# Patient Record
Sex: FEMALE | Race: WHITE | NOT HISPANIC OR LATINO | Employment: FULL TIME | URBAN - METROPOLITAN AREA
[De-identification: names, ages, dates, MRNs, and addresses within clinical notes are randomized per-mention and may not be internally consistent; named-entity substitution may affect disease eponyms.]

---

## 2023-04-21 LAB
ALANINE AMINOTRANSFERASE (SGPT) (U/L) IN SER/PLAS: 10 U/L (ref 7–45)
ALBUMIN (G/DL) IN SER/PLAS: 3.6 G/DL (ref 3.4–5)
ALKALINE PHOSPHATASE (U/L) IN SER/PLAS: 103 U/L (ref 33–136)
ANION GAP IN SER/PLAS: 10 MMOL/L (ref 10–20)
ASPARTATE AMINOTRANSFERASE (SGOT) (U/L) IN SER/PLAS: 12 U/L (ref 9–39)
BILIRUBIN TOTAL (MG/DL) IN SER/PLAS: 0.3 MG/DL (ref 0–1.2)
C REACTIVE PROTEIN (MG/L) IN SER/PLAS: 2.47 MG/DL
CALCIUM (MG/DL) IN SER/PLAS: 8.1 MG/DL (ref 8.6–10.6)
CARBON DIOXIDE, TOTAL (MMOL/L) IN SER/PLAS: 27 MMOL/L (ref 21–32)
CHLORIDE (MMOL/L) IN SER/PLAS: 105 MMOL/L (ref 98–107)
CREATININE (MG/DL) IN SER/PLAS: 0.98 MG/DL (ref 0.5–1.05)
ERYTHROCYTE DISTRIBUTION WIDTH (RATIO) BY AUTOMATED COUNT: 22.1 % (ref 11.5–14.5)
ERYTHROCYTE MEAN CORPUSCULAR HEMOGLOBIN CONCENTRATION (G/DL) BY AUTOMATED: 29.4 G/DL (ref 32–36)
ERYTHROCYTE MEAN CORPUSCULAR VOLUME (FL) BY AUTOMATED COUNT: 83 FL (ref 80–100)
ERYTHROCYTES (10*6/UL) IN BLOOD BY AUTOMATED COUNT: 3.74 X10E12/L (ref 4–5.2)
GFR FEMALE: 63 ML/MIN/1.73M2
GLUCOSE (MG/DL) IN SER/PLAS: 75 MG/DL (ref 74–99)
HEMATOCRIT (%) IN BLOOD BY AUTOMATED COUNT: 30.9 % (ref 36–46)
HEMOGLOBIN (G/DL) IN BLOOD: 9.1 G/DL (ref 12–16)
IMMATURE GRANULOCYTES/100 LEUKOCYTES IN BLOOD BY AUTOMATED COUNT: 0.5 % (ref 0–0.9)
LEUKOCYTES (10*3/UL) IN BLOOD BY AUTOMATED COUNT: 7.9 X10E9/L (ref 4.4–11.3)
MANUAL DIFFERENTIAL Y/N: ABNORMAL
NRBC (PER 100 WBCS) BY AUTOMATED COUNT: 0 /100 WBC (ref 0–0)
PLATELETS (10*3/UL) IN BLOOD AUTOMATED COUNT: 292 X10E9/L (ref 150–450)
POTASSIUM (MMOL/L) IN SER/PLAS: 3.8 MMOL/L (ref 3.5–5.3)
PROTEIN TOTAL: 6.7 G/DL (ref 6.4–8.2)
SODIUM (MMOL/L) IN SER/PLAS: 138 MMOL/L (ref 136–145)
UREA NITROGEN (MG/DL) IN SER/PLAS: 8 MG/DL (ref 6–23)

## 2023-04-22 LAB
BASOPHILS (10*3/UL) IN BLOOD BY MANUAL COUNT - WAM: 0.06 X10E9/L (ref 0–0.1)
BASOPHILS/100 LEUKOCYTES IN BLOOD BY MANUAL COUNT - WAM: 0.8 % (ref 0–2)
EOSINOPHILS (10*3/UL) IN BLOOD BY MANUAL COUNT - WAM: 0.21 X10E9/L (ref 0–0.7)
EOSINOPHILS/100 LEUKOCYTES IN BLOOD BY MANUAL COUNT - WAM: 2.6 % (ref 0–6)
LYMPHOCYTES (10*3/UL) IN BLOOD BY MANUAL COUNT - WAM: 1.35 X10E9/L (ref 1.2–4.8)
LYMPHOCYTES VARIANT/100 LEUKOCYTES IN BLOOD - WAM: 0.9 % (ref 0–2)
LYMPHOCYTES/100 LEUKOCYTES IN BLOOD BY MANUAL COUNT - WAM: 17.1 % (ref 13–44)
MONOCYTES (10*3/UL) IN BLOOD BY MANUAL COUNT - WAM: 0.67 X10E9/L (ref 0.1–1)
MONOCYTES/100 LEUKOCYTES IN BLOOD BY MANUAL COUNT - WAM: 8.5 % (ref 2–10)
NEUTROPHILS (SEGS+BANDS) (10*3/UL) MANUAL COUNT - WAM: 5.54 X10E9/L (ref 1.2–7.7)
OVALOCYTES PRESENCE IN BLOOD BY LIGHT MICROSCOPY: NORMAL
POLYCHROMASIA IN BLOOD BY LIGHT MICROSCOPY: NORMAL
RBC MORPHOLOGY IN BLOOD: NORMAL
SCHISTOCYTES (PRESENCE) IN BLOOD BY LIGHT MICROSCOPY: NORMAL
SEGMENTED NEUTROPHILS (10*3/UL) BLOOD MANUAL - WAM: 5.54 X10E9/L (ref 1.2–7)
SEGMENTED NEUTROPHILS/100 LEUKOCYTES BY MANUAL COUNT -: 70.1 % (ref 40–80)
VARIANT LYMPHOCYTES (10*3/UL) BLOOD MANUAL COUNT - WAM: 0.07 X10E9/L (ref 0–0.5)

## 2023-07-21 LAB
ALANINE AMINOTRANSFERASE (SGPT) (U/L) IN SER/PLAS: 9 U/L (ref 7–45)
ALBUMIN (G/DL) IN SER/PLAS: 3.8 G/DL (ref 3.4–5)
ALKALINE PHOSPHATASE (U/L) IN SER/PLAS: 86 U/L (ref 33–136)
ANION GAP IN SER/PLAS: 11 MMOL/L (ref 10–20)
ASPARTATE AMINOTRANSFERASE (SGOT) (U/L) IN SER/PLAS: 14 U/L (ref 9–39)
BASOPHILS (10*3/UL) IN BLOOD BY AUTOMATED COUNT: 0.07 X10E9/L (ref 0–0.1)
BASOPHILS/100 LEUKOCYTES IN BLOOD BY AUTOMATED COUNT: 1 % (ref 0–2)
BILIRUBIN TOTAL (MG/DL) IN SER/PLAS: 0.4 MG/DL (ref 0–1.2)
C REACTIVE PROTEIN (MG/L) IN SER/PLAS: 1.09 MG/DL
CALCIDIOL (25 OH VITAMIN D3) (NG/ML) IN SER/PLAS: 25 NG/ML
CALCIUM (MG/DL) IN SER/PLAS: 9.2 MG/DL (ref 8.6–10.3)
CARBON DIOXIDE, TOTAL (MMOL/L) IN SER/PLAS: 25 MMOL/L (ref 21–32)
CHLORIDE (MMOL/L) IN SER/PLAS: 102 MMOL/L (ref 98–107)
COBALAMIN (VITAMIN B12) (PG/ML) IN SER/PLAS: 1257 PG/ML (ref 211–911)
CREATININE (MG/DL) IN SER/PLAS: 1.02 MG/DL (ref 0.5–1.05)
EOSINOPHILS (10*3/UL) IN BLOOD BY AUTOMATED COUNT: 0.14 X10E9/L (ref 0–0.7)
EOSINOPHILS/100 LEUKOCYTES IN BLOOD BY AUTOMATED COUNT: 2.1 % (ref 0–6)
ERYTHROCYTE DISTRIBUTION WIDTH (RATIO) BY AUTOMATED COUNT: 22.4 % (ref 11.5–14.5)
ERYTHROCYTE MEAN CORPUSCULAR HEMOGLOBIN CONCENTRATION (G/DL) BY AUTOMATED: 29.9 G/DL (ref 32–36)
ERYTHROCYTE MEAN CORPUSCULAR VOLUME (FL) BY AUTOMATED COUNT: 81 FL (ref 80–100)
ERYTHROCYTES (10*6/UL) IN BLOOD BY AUTOMATED COUNT: 4.11 X10E12/L (ref 4–5.2)
FOLATE (NG/ML) IN SER/PLAS: 12.4 NG/ML
GFR FEMALE: 60 ML/MIN/1.73M2
GLUCOSE (MG/DL) IN SER/PLAS: 80 MG/DL (ref 74–99)
HEMATOCRIT (%) IN BLOOD BY AUTOMATED COUNT: 33.1 % (ref 36–46)
HEMOGLOBIN (G/DL) IN BLOOD: 9.9 G/DL (ref 12–16)
IMMATURE GRANULOCYTES/100 LEUKOCYTES IN BLOOD BY AUTOMATED COUNT: 0.3 % (ref 0–0.9)
IRON (UG/DL) IN SER/PLAS: 16 UG/DL (ref 35–150)
IRON BINDING CAPACITY (UG/DL) IN SER/PLAS: 420 UG/DL (ref 240–445)
IRON SATURATION (%) IN SER/PLAS: 4 % (ref 25–45)
LEUKOCYTES (10*3/UL) IN BLOOD BY AUTOMATED COUNT: 6.8 X10E9/L (ref 4.4–11.3)
LYMPHOCYTES (10*3/UL) IN BLOOD BY AUTOMATED COUNT: 1.87 X10E9/L (ref 1.2–4.8)
LYMPHOCYTES/100 LEUKOCYTES IN BLOOD BY AUTOMATED COUNT: 27.7 % (ref 13–44)
MONOCYTES (10*3/UL) IN BLOOD BY AUTOMATED COUNT: 0.5 X10E9/L (ref 0.1–1)
MONOCYTES/100 LEUKOCYTES IN BLOOD BY AUTOMATED COUNT: 7.4 % (ref 2–10)
NEUTROPHILS (10*3/UL) IN BLOOD BY AUTOMATED COUNT: 4.16 X10E9/L (ref 1.2–7.7)
NEUTROPHILS/100 LEUKOCYTES IN BLOOD BY AUTOMATED COUNT: 61.5 % (ref 40–80)
PLATELETS (10*3/UL) IN BLOOD AUTOMATED COUNT: 303 X10E9/L (ref 150–450)
POTASSIUM (MMOL/L) IN SER/PLAS: 3.9 MMOL/L (ref 3.5–5.3)
PROTEIN TOTAL: 7.3 G/DL (ref 6.4–8.2)
SODIUM (MMOL/L) IN SER/PLAS: 134 MMOL/L (ref 136–145)
UREA NITROGEN (MG/DL) IN SER/PLAS: 11 MG/DL (ref 6–23)

## 2023-07-22 LAB — FERRITIN (UG/LL) IN SER/PLAS: 12 UG/L (ref 8–150)

## 2023-10-26 PROBLEM — E53.8 VITAMIN B 12 DEFICIENCY: Status: ACTIVE | Noted: 2023-10-26

## 2023-10-26 PROBLEM — Z78.0 POSTMENOPAUSAL ESTROGEN DEFICIENCY: Status: ACTIVE | Noted: 2023-10-26

## 2023-10-26 PROBLEM — D64.9 ANEMIA: Status: ACTIVE | Noted: 2023-10-26

## 2023-10-26 PROBLEM — F17.210 CIGARETTE NICOTINE DEPENDENCE: Status: ACTIVE | Noted: 2023-10-26

## 2023-10-26 PROBLEM — R07.9 CHEST PAIN: Status: ACTIVE | Noted: 2023-10-26

## 2023-10-26 PROBLEM — E55.9 VITAMIN D DEFICIENCY: Status: ACTIVE | Noted: 2023-10-26

## 2023-10-26 PROBLEM — M06.9 RHEUMATOID ARTHRITIS (MULTI): Status: ACTIVE | Noted: 2023-10-26

## 2023-10-26 PROBLEM — R30.0 DYSURIA: Status: ACTIVE | Noted: 2023-10-26

## 2023-10-26 PROBLEM — K21.9 ESOPHAGEAL REFLUX: Status: ACTIVE | Noted: 2023-10-26

## 2023-10-26 PROBLEM — B00.2 ORAL HERPES: Status: ACTIVE | Noted: 2023-10-26

## 2023-10-26 PROBLEM — M85.80 OSTEOPENIA: Status: ACTIVE | Noted: 2023-10-26

## 2023-10-26 PROBLEM — E83.51 HYPOCALCEMIA: Status: ACTIVE | Noted: 2023-10-26

## 2023-10-26 PROBLEM — K22.70 BARRETT'S ESOPHAGUS: Status: ACTIVE | Noted: 2023-10-26

## 2023-10-26 PROBLEM — M25.579 ANKLE PAIN: Status: ACTIVE | Noted: 2023-10-26

## 2023-10-26 RX ORDER — OMEPRAZOLE 40 MG/1
1 CAPSULE, DELAYED RELEASE ORAL DAILY
COMMUNITY
Start: 2018-02-02 | End: 2024-02-07

## 2023-10-26 RX ORDER — FOLIC ACID 0.8 MG
1 TABLET ORAL DAILY
COMMUNITY
Start: 2013-12-06

## 2023-10-26 RX ORDER — LANOLIN ALCOHOL/MO/W.PET/CERES
100 CREAM (GRAM) TOPICAL DAILY
COMMUNITY

## 2023-10-26 RX ORDER — PREDNISONE 5 MG/1
5 TABLET ORAL DAILY PRN
COMMUNITY
Start: 2013-12-06

## 2023-10-26 RX ORDER — FERROUS SULFATE 325(65) MG
65 TABLET, DELAYED RELEASE (ENTERIC COATED) ORAL DAILY
COMMUNITY
End: 2023-10-27

## 2023-10-26 RX ORDER — METHOTREXATE 2.5 MG/1
8 TABLET ORAL
COMMUNITY
Start: 2013-10-14 | End: 2023-10-27

## 2023-10-26 RX ORDER — HYDROXYCHLOROQUINE SULFATE 200 MG/1
1 TABLET, FILM COATED ORAL DAILY
COMMUNITY
Start: 2013-10-14 | End: 2023-12-08

## 2023-10-27 ENCOUNTER — LAB (OUTPATIENT)
Dept: LAB | Facility: LAB | Age: 68
End: 2023-10-27
Payer: MEDICARE

## 2023-10-27 ENCOUNTER — OFFICE VISIT (OUTPATIENT)
Dept: RHEUMATOLOGY | Facility: CLINIC | Age: 68
End: 2023-10-27
Payer: MEDICARE

## 2023-10-27 VITALS
HEIGHT: 70 IN | BODY MASS INDEX: 23.22 KG/M2 | TEMPERATURE: 97.1 F | OXYGEN SATURATION: 98 % | WEIGHT: 162.2 LBS | HEART RATE: 63 BPM | SYSTOLIC BLOOD PRESSURE: 116 MMHG | DIASTOLIC BLOOD PRESSURE: 68 MMHG

## 2023-10-27 DIAGNOSIS — M06.9 RHEUMATOID ARTHRITIS INVOLVING MULTIPLE SITES, UNSPECIFIED WHETHER RHEUMATOID FACTOR PRESENT (MULTI): Primary | ICD-10-CM

## 2023-10-27 DIAGNOSIS — M06.9 RHEUMATOID ARTHRITIS INVOLVING MULTIPLE SITES, UNSPECIFIED WHETHER RHEUMATOID FACTOR PRESENT (MULTI): ICD-10-CM

## 2023-10-27 LAB
ALBUMIN SERPL BCP-MCNC: 3.8 G/DL (ref 3.4–5)
ALP SERPL-CCNC: 82 U/L (ref 33–136)
ALT SERPL W P-5'-P-CCNC: 10 U/L (ref 7–45)
ANION GAP SERPL CALC-SCNC: 14 MMOL/L (ref 10–20)
AST SERPL W P-5'-P-CCNC: 16 U/L (ref 9–39)
BASOPHILS # BLD AUTO: 0.04 X10*3/UL (ref 0–0.1)
BASOPHILS NFR BLD AUTO: 0.6 %
BILIRUB SERPL-MCNC: 0.3 MG/DL (ref 0–1.2)
BUN SERPL-MCNC: 9 MG/DL (ref 6–23)
CALCIUM SERPL-MCNC: 9 MG/DL (ref 8.6–10.3)
CHLORIDE SERPL-SCNC: 105 MMOL/L (ref 98–107)
CO2 SERPL-SCNC: 27 MMOL/L (ref 21–32)
CREAT SERPL-MCNC: 0.96 MG/DL (ref 0.5–1.05)
CRP SERPL-MCNC: 0.41 MG/DL
EOSINOPHIL # BLD AUTO: 0.18 X10*3/UL (ref 0–0.7)
EOSINOPHIL NFR BLD AUTO: 2.6 %
ERYTHROCYTE [DISTWIDTH] IN BLOOD BY AUTOMATED COUNT: 20.7 % (ref 11.5–14.5)
GFR SERPL CREATININE-BSD FRML MDRD: 65 ML/MIN/1.73M*2
GLUCOSE SERPL-MCNC: 77 MG/DL (ref 74–99)
HCT VFR BLD AUTO: 36.3 % (ref 36–46)
HGB BLD-MCNC: 11.3 G/DL (ref 12–16)
IMM GRANULOCYTES # BLD AUTO: 0.02 X10*3/UL (ref 0–0.7)
IMM GRANULOCYTES NFR BLD AUTO: 0.3 % (ref 0–0.9)
LYMPHOCYTES # BLD AUTO: 1.84 X10*3/UL (ref 1.2–4.8)
LYMPHOCYTES NFR BLD AUTO: 27 %
MCH RBC QN AUTO: 26.3 PG (ref 26–34)
MCHC RBC AUTO-ENTMCNC: 31.1 G/DL (ref 32–36)
MCV RBC AUTO: 85 FL (ref 80–100)
MONOCYTES # BLD AUTO: 0.66 X10*3/UL (ref 0.1–1)
MONOCYTES NFR BLD AUTO: 9.7 %
NEUTROPHILS # BLD AUTO: 4.07 X10*3/UL (ref 1.2–7.7)
NEUTROPHILS NFR BLD AUTO: 59.8 %
NRBC BLD-RTO: 0 /100 WBCS (ref 0–0)
PLATELET # BLD AUTO: 251 X10*3/UL (ref 150–450)
PMV BLD AUTO: 12.5 FL (ref 7.5–11.5)
POTASSIUM SERPL-SCNC: 4.1 MMOL/L (ref 3.5–5.3)
PROT SERPL-MCNC: 6.7 G/DL (ref 6.4–8.2)
RBC # BLD AUTO: 4.29 X10*6/UL (ref 4–5.2)
SODIUM SERPL-SCNC: 142 MMOL/L (ref 136–145)
WBC # BLD AUTO: 6.8 X10*3/UL (ref 4.4–11.3)

## 2023-10-27 PROCEDURE — 1160F RVW MEDS BY RX/DR IN RCRD: CPT | Performed by: INTERNAL MEDICINE

## 2023-10-27 PROCEDURE — 86140 C-REACTIVE PROTEIN: CPT

## 2023-10-27 PROCEDURE — 99214 OFFICE O/P EST MOD 30 MIN: CPT | Performed by: INTERNAL MEDICINE

## 2023-10-27 PROCEDURE — G0008 ADMIN INFLUENZA VIRUS VAC: HCPCS | Performed by: INTERNAL MEDICINE

## 2023-10-27 PROCEDURE — 85025 COMPLETE CBC W/AUTO DIFF WBC: CPT

## 2023-10-27 PROCEDURE — 80053 COMPREHEN METABOLIC PANEL: CPT

## 2023-10-27 PROCEDURE — 90662 IIV NO PRSV INCREASED AG IM: CPT | Performed by: INTERNAL MEDICINE

## 2023-10-27 PROCEDURE — 1159F MED LIST DOCD IN RCRD: CPT | Performed by: INTERNAL MEDICINE

## 2023-10-27 PROCEDURE — 1125F AMNT PAIN NOTED PAIN PRSNT: CPT | Performed by: INTERNAL MEDICINE

## 2023-10-27 PROCEDURE — 36415 COLL VENOUS BLD VENIPUNCTURE: CPT

## 2023-10-27 RX ORDER — VIT C/E/ZN/COPPR/LUTEIN/ZEAXAN 250MG-90MG
CAPSULE ORAL
COMMUNITY

## 2023-10-27 RX ORDER — METHOTREXATE 2.5 MG/1
20 TABLET ORAL
COMMUNITY
End: 2024-01-05

## 2023-10-27 RX ORDER — LEVOCETIRIZINE DIHYDROCHLORIDE 5 MG/1
5 TABLET, FILM COATED ORAL NIGHTLY
COMMUNITY
Start: 2023-05-05

## 2023-10-27 RX ORDER — FLUTICASONE PROPIONATE 50 MCG
SPRAY, SUSPENSION (ML) NASAL
COMMUNITY
Start: 2023-05-05

## 2023-10-27 ASSESSMENT — ENCOUNTER SYMPTOMS
LOSS OF SENSATION IN FEET: 0
OCCASIONAL FEELINGS OF UNSTEADINESS: 0
DEPRESSION: 0

## 2023-10-27 ASSESSMENT — PATIENT HEALTH QUESTIONNAIRE - PHQ9
1. LITTLE INTEREST OR PLEASURE IN DOING THINGS: NOT AT ALL
2. FEELING DOWN, DEPRESSED OR HOPELESS: NOT AT ALL
SUM OF ALL RESPONSES TO PHQ9 QUESTIONS 1 AND 2: 0

## 2023-10-27 NOTE — PROGRESS NOTES
Subjective   Patient ID: Tika Deal is a 68 y.o. female who presents for Follow-up.    HPI 69 yo F here for f/u re: RA.     She remains on methotrexate 20 mg orally weekly , folic acid 1 mg daily. and hydroxychloroquine 200 mg daily.   She is overall doing well.  She is not currently having much joint pain or morning stiffness.    She had eye exam 9/23 by Dr Montes (we are getting report faxed).    She had EGD and colonoscopy September 25, 2023.  She is also getting a capsule endoscopy because of iron deficiency.  She is having difficulty tolerating an iron supplement but she is taking a multiple vitamin for iron.  Pathology report is still pending from upper endoscopy, because she has Sanford's esophagus.    Low dose chest CT was done for lung cancer screening through Dr. Bishop.  She has one 6 mm nodule, scan will be repeated in 1 year.    She had Pneumovax January 2014, 1/21/22.  She had Prevnar 13 April 7, 2017.    DEXA 5/17: T score -2.3 FN, -2.3 TH, -1.0 LS.  Estimated 10 year fracture risk by FRAX is 7.6% for hip fracture, 25% for major osteoporotic fracture. (risk factors include prior foot fracture, smoking, and RA).  DEXA 6/19: T score -2.2 femoral neck, T score -1.0 lumbar spine  DEXA May 2022: T score -2.1 left total hip, T score -2.2 left femoral neck, normal lumbar spine    Labs April 2022: Hemoglobin 10.9, hematocrit 35.9, WBC 7.6, platelets 292, CRP 0.7 (less than 1), CMP normal except creatinine 1.07 (GFR 57)  Labs July 2022: CMP normal, CBC normal except hemoglobin 10.2 hematocrit 34.9, CRP 0.77 (less than 1)  Labs 10/22: CMP normal except GFR 59, CRP 1.59 (less than 1), ferritin 40, vitamin B12 103, folic acid 23  Labs January 2023: CBC normal except hemoglobin 9.7 hematocrit 31.9, CRP 0.35 (normal less than 1), CMP normal except calcium 8.5, vitamin B12 685  Labs 4/23: CBC normal except hemoglobin 9.1 hematocrit 30.9, CMP normal except calcium 8.1 (normal 8.6-10.6), CRP 2.47 (normal less  "than 1)  Labs July 2023: CBC normal except hemoglobin 9.9 hematocrit 33.1, CMP normal except sodium 134, vitamin B12  1257, ferritin 12, iron 16, TIBC 420, percent saturation 4    Medical problem list:   - RA   - Sanford's esophagus   - Osteopenia- femoral neck T score -2.2 on DEXA June 2019.       ROS:  General: Denies fevers or chills.  CV: Denies chest pain or palpitations.  Denies leg edema.  Lungs: Denies coughing or shortness of breath.  Skin: Denies rashes or nodules.  MS: Denies joint pain or joint swelling.     Objective   /68 (BP Location: Right arm, Patient Position: Sitting, BP Cuff Size: Adult)   Pulse 63   Temp 36.2 °C (97.1 °F) (Temporal)   Ht 1.765 m (5' 9.5\")   Wt 73.6 kg (162 lb 3.2 oz)   SpO2 98%   BMI 23.61 kg/m²     Physical Exam  HEENT: PERRL, EOMI  Neck: Supple, no nodes.  CV: RRR, no MGR.  Lungs: Clear, no rales or wheezes.  Abdomen: Soft, nontender. No hepatosplenomegaly.  Extremities:  No cyanosis, clubbing, or edema.  MS: Swollen: 0         Tender: 0         Patient global: 2         Evaluator global: 2          CDAI: 4 (low disease activity)            Skin: No nodules or vasculitic lesions.      Assessment/Plan   Problem List Items Addressed This Visit             ICD-10-CM    Rheumatoid arthritis (CMS/HCC) - Primary M06.9      1. RA- low disease by CDAI.     2. Osteopenia- DEXA appears stable 5/22.. . Oral bisphosphonate is however now contraindicated because of Sanford's esophagus.      3. Sanford's esophagus- now on omeprazole 40 mg daily.  EGD and colonoscopy were done September 25, 2023, pathology report from EGD is pending.  She is also getting capsule endoscopy.    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    5. Anemia-appears consistent with iron deficiency.  EGD and colonoscopy were done 9/25/23, capsule endoscopy is being done.    6. Vit B12 deficiency-currently on oral vitamin B12. Vitamin B12 was normal January 2023.    7. BMI 23.6- improved.    Plan:       High dose flu " vaccine was given today.       Skip 1 dose of methotrexate after the vaccine, to    get better immune response.        Check CBC with diff, CMP, CRP.        Follow-up in 3 months.

## 2023-10-27 NOTE — PATIENT INSTRUCTIONS
High dose flu vaccine was given today.  Skip 1 dose of methotrexate after the vaccine, to get better immune response.  Check CBC with diff, CMP, CRP.  Follow-up in 3 months.

## 2023-12-08 DIAGNOSIS — M06.9 RHEUMATOID ARTHRITIS, UNSPECIFIED (MULTI): ICD-10-CM

## 2023-12-08 RX ORDER — HYDROXYCHLOROQUINE SULFATE 200 MG/1
TABLET, FILM COATED ORAL DAILY
Qty: 30 TABLET | Refills: 3 | Status: SHIPPED | OUTPATIENT
Start: 2023-12-08 | End: 2024-04-08

## 2024-01-05 DIAGNOSIS — M06.9 RHEUMATOID ARTHRITIS, UNSPECIFIED (MULTI): ICD-10-CM

## 2024-01-05 DIAGNOSIS — M06.9 RHEUMATOID ARTHRITIS, INVOLVING UNSPECIFIED SITE, UNSPECIFIED WHETHER RHEUMATOID FACTOR PRESENT (MULTI): Primary | ICD-10-CM

## 2024-01-05 RX ORDER — METHOTREXATE 2.5 MG/1
TABLET ORAL
Qty: 32 TABLET | Refills: 1 | Status: SHIPPED | OUTPATIENT
Start: 2024-01-05 | End: 2024-03-08

## 2024-01-30 ENCOUNTER — OFFICE VISIT (OUTPATIENT)
Dept: RHEUMATOLOGY | Facility: CLINIC | Age: 69
End: 2024-01-30
Payer: MEDICARE

## 2024-01-30 ENCOUNTER — LAB (OUTPATIENT)
Dept: LAB | Facility: LAB | Age: 69
End: 2024-01-30
Payer: MEDICARE

## 2024-01-30 VITALS
HEIGHT: 70 IN | OXYGEN SATURATION: 99 % | WEIGHT: 157.8 LBS | SYSTOLIC BLOOD PRESSURE: 128 MMHG | TEMPERATURE: 96.6 F | HEART RATE: 60 BPM | DIASTOLIC BLOOD PRESSURE: 80 MMHG | BODY MASS INDEX: 22.59 KG/M2

## 2024-01-30 DIAGNOSIS — M06.9 RHEUMATOID ARTHRITIS INVOLVING MULTIPLE SITES, UNSPECIFIED WHETHER RHEUMATOID FACTOR PRESENT (MULTI): Primary | ICD-10-CM

## 2024-01-30 DIAGNOSIS — M06.9 RHEUMATOID ARTHRITIS, INVOLVING UNSPECIFIED SITE, UNSPECIFIED WHETHER RHEUMATOID FACTOR PRESENT (MULTI): ICD-10-CM

## 2024-01-30 LAB
ALBUMIN SERPL BCP-MCNC: 4 G/DL (ref 3.4–5)
ALP SERPL-CCNC: 80 U/L (ref 33–136)
ALT SERPL W P-5'-P-CCNC: 12 U/L (ref 7–45)
ANION GAP SERPL CALC-SCNC: 13 MMOL/L (ref 10–20)
AST SERPL W P-5'-P-CCNC: 20 U/L (ref 9–39)
BASOPHILS # BLD AUTO: 0.04 X10*3/UL (ref 0–0.1)
BASOPHILS NFR BLD AUTO: 0.5 %
BILIRUB SERPL-MCNC: 0.5 MG/DL (ref 0–1.2)
BUN SERPL-MCNC: 8 MG/DL (ref 6–23)
BURR CELLS BLD QL SMEAR: NORMAL
CALCIUM SERPL-MCNC: 9.1 MG/DL (ref 8.6–10.3)
CHLORIDE SERPL-SCNC: 101 MMOL/L (ref 98–107)
CO2 SERPL-SCNC: 27 MMOL/L (ref 21–32)
CREAT SERPL-MCNC: 0.96 MG/DL (ref 0.5–1.05)
CRP SERPL-MCNC: 0.36 MG/DL
EGFRCR SERPLBLD CKD-EPI 2021: 65 ML/MIN/1.73M*2
EOSINOPHIL # BLD AUTO: 0.16 X10*3/UL (ref 0–0.7)
EOSINOPHIL NFR BLD AUTO: 1.9 %
ERYTHROCYTE [DISTWIDTH] IN BLOOD BY AUTOMATED COUNT: 20.9 % (ref 11.5–14.5)
GLUCOSE SERPL-MCNC: 85 MG/DL (ref 74–99)
HCT VFR BLD AUTO: 39.1 % (ref 36–46)
HGB BLD-MCNC: 12.3 G/DL (ref 12–16)
IMM GRANULOCYTES # BLD AUTO: 0.01 X10*3/UL (ref 0–0.7)
IMM GRANULOCYTES NFR BLD AUTO: 0.1 % (ref 0–0.9)
LYMPHOCYTES # BLD AUTO: 2.26 X10*3/UL (ref 1.2–4.8)
LYMPHOCYTES NFR BLD AUTO: 26.8 %
MCH RBC QN AUTO: 28 PG (ref 26–34)
MCHC RBC AUTO-ENTMCNC: 31.5 G/DL (ref 32–36)
MCV RBC AUTO: 89 FL (ref 80–100)
MONOCYTES # BLD AUTO: 0.73 X10*3/UL (ref 0.1–1)
MONOCYTES NFR BLD AUTO: 8.6 %
NEUTROPHILS # BLD AUTO: 5.24 X10*3/UL (ref 1.2–7.7)
NEUTROPHILS NFR BLD AUTO: 62.1 %
NRBC BLD-RTO: 0 /100 WBCS (ref 0–0)
PLATELET # BLD AUTO: 235 X10*3/UL (ref 150–450)
POTASSIUM SERPL-SCNC: 4.2 MMOL/L (ref 3.5–5.3)
PROT SERPL-MCNC: 7 G/DL (ref 6.4–8.2)
RBC # BLD AUTO: 4.4 X10*6/UL (ref 4–5.2)
RBC MORPH BLD: NORMAL
SODIUM SERPL-SCNC: 137 MMOL/L (ref 136–145)
WBC # BLD AUTO: 8.4 X10*3/UL (ref 4.4–11.3)

## 2024-01-30 PROCEDURE — 3008F BODY MASS INDEX DOCD: CPT | Performed by: INTERNAL MEDICINE

## 2024-01-30 PROCEDURE — 99214 OFFICE O/P EST MOD 30 MIN: CPT | Performed by: INTERNAL MEDICINE

## 2024-01-30 PROCEDURE — 80053 COMPREHEN METABOLIC PANEL: CPT

## 2024-01-30 PROCEDURE — 1159F MED LIST DOCD IN RCRD: CPT | Performed by: INTERNAL MEDICINE

## 2024-01-30 PROCEDURE — 1125F AMNT PAIN NOTED PAIN PRSNT: CPT | Performed by: INTERNAL MEDICINE

## 2024-01-30 PROCEDURE — 85025 COMPLETE CBC W/AUTO DIFF WBC: CPT

## 2024-01-30 PROCEDURE — 86140 C-REACTIVE PROTEIN: CPT

## 2024-01-30 PROCEDURE — 36415 COLL VENOUS BLD VENIPUNCTURE: CPT

## 2024-01-30 ASSESSMENT — ENCOUNTER SYMPTOMS
DEPRESSION: 0
OCCASIONAL FEELINGS OF UNSTEADINESS: 0
LOSS OF SENSATION IN FEET: 0

## 2024-01-30 ASSESSMENT — PATIENT HEALTH QUESTIONNAIRE - PHQ9
2. FEELING DOWN, DEPRESSED OR HOPELESS: NOT AT ALL
1. LITTLE INTEREST OR PLEASURE IN DOING THINGS: NOT AT ALL
SUM OF ALL RESPONSES TO PHQ9 QUESTIONS 1 AND 2: 0

## 2024-01-30 NOTE — PROGRESS NOTES
Subjective   Patient ID: Tika Deal is a 68 y.o. female who presents for Follow-up.    HPI 67 yo F here for f/u re: RA.      She remains on methotrexate 20 mg orally weekly , folic acid 1 mg daily. and hydroxychloroquine 200 mg daily.   She is overall doing well.  She is not currently having much joint pain or morning stiffness.  She has not needed to use prednisone since her last visit.     She had eye exam 9/23 by Dr Montes (we are getting report faxed). Includes OCT and 10-2 VF.    She had EGD and colonoscopy 10/23.  Both were unremarkable. She was told to repeat colonoscopy in 7 years,      Had Low dose chest CT was done for lung cancer screening through Dr. Bishop.  She has one 6 mm nodule, scan will be repeated in 1 year.     She had Pneumovax January 2014, 1/21/22.  She had Prevnar 13 April 7, 2017.     DEXA 5/17: T score -2.3 FN, -2.3 TH, -1.0 LS.  Estimated 10 year fracture risk by FRAX is 7.6% for hip fracture, 25% for major osteoporotic fracture. (risk factors include prior foot fracture, smoking, and RA).  DEXA 6/19: T score -2.2 femoral neck, T score -1.0 lumbar spine  DEXA May 2022: T score -2.1 left total hip, T score -2.2 left femoral neck, normal lumbar spine     Labs July 2023: CBC normal except hemoglobin 9.9 hematocrit 33.1, CMP normal except sodium 134, vitamin B12  1257, ferritin 12, iron 16, TIBC 420, percent saturation 4  Labs 10/23: CRP 0.41 (less than 1), CMP, CBC normal except hemoglobin 11.3     Medical problem list:   - RA   - Sanford's esophagus   - Osteopenia- femoral neck T score -2.2 on DEXA June 2019.        ROS:  General: Denies fevers or chills.  CV: Denies chest pain or palpitations.  Denies leg edema.  Lungs: Denies coughing or shortness of breath.  Skin: Denies rashes or nodules.  MS: Denies joint pain or joint swelling.     Objective   BP (!) 130/92 (BP Location: Left arm, Patient Position: Sitting, BP Cuff Size: Small adult)   Pulse 60   Temp 35.9 °C (96.6 °F)   Ht  "1.765 m (5' 9.5\")   Wt 71.6 kg (157 lb 12.8 oz)   SpO2 99%   BMI 22.97 kg/m²     Physical Exam  HEENT: PERRL, EOMI  Neck: Supple, no nodes.  CV: RRR, no MGR.  Lungs: Clear, no rales or wheezes.  Abdomen: Soft, nontender. No hepatosplenomegaly.  Extremities:  No cyanosis, clubbing, or edema.  MS: Swollen: 0         Tender: 0         Patient global: 2         Evaluator global: 2          CDAI: 4 (low disease activity)            Skin: No nodules or vasculitic lesions. 1.5 cm erythematous patch on anterior scalp with slight scaling-appears consistent with eczema.    Assessment/Plan   Problem List Items Addressed This Visit             ICD-10-CM    Rheumatoid arthritis (CMS/HCC) - Primary M06.9    BMI 22.0-22.9, adult Z68.22       1. RA- low disease by CDAI.      2. Osteopenia- DEXA appears stable 5/22.. . Oral bisphosphonate is however now contraindicated because of Sanford's esophagus.       3. Sanford's esophagus- now on omeprazole 40 mg daily.  EGD and colonoscopy were done Oct 25, 2023.     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     5. Anemia-appears consistent with iron deficiency.  EGD and colonoscopy were done 10/25/23, capsule endoscopy is being done.     6. Vit B12 deficiency-currently on oral vitamin B12. Vitamin B12 was normal January 2023.     7. BMI 22.97- improved.    8. Smoker-still smoking 11 to 12 cigarettes daily.  Had low-dose chest CT done 2023 for lung cancer screening.  She did have a 5 mm nodule, was told to repeat in 1 year.    9.  Eczema on scalp-using hydrocortisone 1% cream twice daily for now.     Plan:  Continue hydrocortisone cream 1% twice daily to scalp as needed.  If scalp does not improve, you may need dermatology evaluation.  Check CBC with differential, CMP, CRP.  Follow-up in 3 months.             "

## 2024-02-07 DIAGNOSIS — K22.70 BARRETT'S ESOPHAGUS WITHOUT DYSPLASIA: ICD-10-CM

## 2024-02-07 RX ORDER — OMEPRAZOLE 40 MG/1
40 CAPSULE, DELAYED RELEASE ORAL DAILY
Qty: 30 CAPSULE | Refills: 5 | Status: SHIPPED | OUTPATIENT
Start: 2024-02-07

## 2024-03-08 DIAGNOSIS — M06.9 RHEUMATOID ARTHRITIS, UNSPECIFIED (MULTI): ICD-10-CM

## 2024-03-08 RX ORDER — METHOTREXATE 2.5 MG/1
TABLET ORAL
Qty: 32 TABLET | Refills: 2 | Status: SHIPPED | OUTPATIENT
Start: 2024-03-08

## 2024-04-08 DIAGNOSIS — M06.9 RHEUMATOID ARTHRITIS, UNSPECIFIED (MULTI): ICD-10-CM

## 2024-04-08 RX ORDER — HYDROXYCHLOROQUINE SULFATE 200 MG/1
TABLET, FILM COATED ORAL DAILY
Qty: 90 TABLET | Refills: 1 | Status: SHIPPED | OUTPATIENT
Start: 2024-04-08

## 2024-05-02 ENCOUNTER — OFFICE VISIT (OUTPATIENT)
Dept: RHEUMATOLOGY | Facility: CLINIC | Age: 69
End: 2024-05-02
Payer: MEDICARE

## 2024-05-02 ENCOUNTER — LAB (OUTPATIENT)
Dept: LAB | Facility: LAB | Age: 69
End: 2024-05-02
Payer: MEDICARE

## 2024-05-02 VITALS
WEIGHT: 148 LBS | OXYGEN SATURATION: 99 % | BODY MASS INDEX: 21.19 KG/M2 | HEIGHT: 70 IN | DIASTOLIC BLOOD PRESSURE: 70 MMHG | TEMPERATURE: 97.6 F | HEART RATE: 77 BPM | SYSTOLIC BLOOD PRESSURE: 134 MMHG

## 2024-05-02 DIAGNOSIS — M06.9 RHEUMATOID ARTHRITIS INVOLVING MULTIPLE SITES, UNSPECIFIED WHETHER RHEUMATOID FACTOR PRESENT (MULTI): ICD-10-CM

## 2024-05-02 DIAGNOSIS — R63.4 WEIGHT LOSS: ICD-10-CM

## 2024-05-02 DIAGNOSIS — Z78.0 POSTMENOPAUSAL ESTROGEN DEFICIENCY: Primary | ICD-10-CM

## 2024-05-02 DIAGNOSIS — D09.9 SQUAMOUS CELL CARCINOMA IN SITU: ICD-10-CM

## 2024-05-02 LAB
ALBUMIN SERPL BCP-MCNC: 3.7 G/DL (ref 3.4–5)
ALP SERPL-CCNC: 75 U/L (ref 33–136)
ALT SERPL W P-5'-P-CCNC: 11 U/L (ref 7–45)
ANION GAP SERPL CALC-SCNC: 12 MMOL/L (ref 10–20)
AST SERPL W P-5'-P-CCNC: 19 U/L (ref 9–39)
BASOPHILS # BLD AUTO: 0.05 X10*3/UL (ref 0–0.1)
BASOPHILS NFR BLD AUTO: 0.6 %
BILIRUB SERPL-MCNC: 0.6 MG/DL (ref 0–1.2)
BUN SERPL-MCNC: 8 MG/DL (ref 6–23)
CALCIUM SERPL-MCNC: 8.8 MG/DL (ref 8.6–10.6)
CHLORIDE SERPL-SCNC: 102 MMOL/L (ref 98–107)
CO2 SERPL-SCNC: 27 MMOL/L (ref 21–32)
CREAT SERPL-MCNC: 1 MG/DL (ref 0.5–1.05)
CRP SERPL-MCNC: 0.79 MG/DL
EGFRCR SERPLBLD CKD-EPI 2021: 62 ML/MIN/1.73M*2
EOSINOPHIL # BLD AUTO: 0.17 X10*3/UL (ref 0–0.7)
EOSINOPHIL NFR BLD AUTO: 1.9 %
ERYTHROCYTE [DISTWIDTH] IN BLOOD BY AUTOMATED COUNT: 19.1 % (ref 11.5–14.5)
GLUCOSE SERPL-MCNC: 94 MG/DL (ref 74–99)
HCT VFR BLD AUTO: 37.5 % (ref 36–46)
HGB BLD-MCNC: 12.2 G/DL (ref 12–16)
IMM GRANULOCYTES # BLD AUTO: 0.03 X10*3/UL (ref 0–0.7)
IMM GRANULOCYTES NFR BLD AUTO: 0.3 % (ref 0–0.9)
LYMPHOCYTES # BLD AUTO: 1.92 X10*3/UL (ref 1.2–4.8)
LYMPHOCYTES NFR BLD AUTO: 21.9 %
MCH RBC QN AUTO: 29.2 PG (ref 26–34)
MCHC RBC AUTO-ENTMCNC: 32.5 G/DL (ref 32–36)
MCV RBC AUTO: 90 FL (ref 80–100)
MONOCYTES # BLD AUTO: 0.56 X10*3/UL (ref 0.1–1)
MONOCYTES NFR BLD AUTO: 6.4 %
NEUTROPHILS # BLD AUTO: 6.02 X10*3/UL (ref 1.2–7.7)
NEUTROPHILS NFR BLD AUTO: 68.9 %
NRBC BLD-RTO: 0 /100 WBCS (ref 0–0)
PLATELET # BLD AUTO: 232 X10*3/UL (ref 150–450)
POTASSIUM SERPL-SCNC: 4 MMOL/L (ref 3.5–5.3)
PROT SERPL-MCNC: 6.3 G/DL (ref 6.4–8.2)
RBC # BLD AUTO: 4.18 X10*6/UL (ref 4–5.2)
SODIUM SERPL-SCNC: 137 MMOL/L (ref 136–145)
TSH SERPL-ACNC: 2.57 MIU/L (ref 0.44–3.98)
WBC # BLD AUTO: 8.8 X10*3/UL (ref 4.4–11.3)

## 2024-05-02 PROCEDURE — 84443 ASSAY THYROID STIM HORMONE: CPT

## 2024-05-02 PROCEDURE — 86140 C-REACTIVE PROTEIN: CPT

## 2024-05-02 PROCEDURE — 1159F MED LIST DOCD IN RCRD: CPT | Performed by: INTERNAL MEDICINE

## 2024-05-02 PROCEDURE — 36415 COLL VENOUS BLD VENIPUNCTURE: CPT

## 2024-05-02 PROCEDURE — 99214 OFFICE O/P EST MOD 30 MIN: CPT | Performed by: INTERNAL MEDICINE

## 2024-05-02 PROCEDURE — 3008F BODY MASS INDEX DOCD: CPT | Performed by: INTERNAL MEDICINE

## 2024-05-02 PROCEDURE — 80053 COMPREHEN METABOLIC PANEL: CPT

## 2024-05-02 PROCEDURE — 85025 COMPLETE CBC W/AUTO DIFF WBC: CPT

## 2024-05-02 PROCEDURE — 1160F RVW MEDS BY RX/DR IN RCRD: CPT | Performed by: INTERNAL MEDICINE

## 2024-05-02 NOTE — PROGRESS NOTES
Subjective   Patient ID: Tika Deal is a 68 y.o. female who presents for Follow-up.    HPI 69 yo F here for f/u re: RA.      She remains on methotrexate 20 mg orally weekly , folic acid 1 mg daily. and hydroxychloroquine 200 mg daily.   She is overall doing well.  She is not currently having much joint pain or morning stiffness.  She has not needed to use prednisone since her last visit.     She had eye exam 9/23 by Dr Montes (we are getting report faxed). Includes OCT and 10-2 VF.    She had EGD and colonoscopy 10/23.  Both were unremarkable. She was told to repeat colonoscopy in 7 years,      Had Low dose chest CT was done for lung cancer screening through Dr. Bishop 10/23.  She has one 6 mm nodule, scan will be repeated in 1 year.    She had a skin lesion on her back that was biopsied by Dr. Bishop.  Path returned as squamous cell carcinoma.  She had further excision by Dr. 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 Margins were clear.  She has appointment to see Dr. Sharp 5/07/24.    She has lost 8 pounds since 1/24 (unintentional).  She states that she feels well.  She has a good appetite and not fatigued.  She denies fever or night sweats.     She had Pneumovax January 2014, 1/21/22.  She had Prevnar 13 April 7, 2017.     DEXA 5/17: T score -2.3 FN, -2.3 TH, -1.0 LS.  Estimated 10 year fracture risk by FRAX is 7.6% for hip fracture, 25% for major osteoporotic fracture. (risk factors include prior foot fracture, smoking, and RA).  DEXA 6/19: T score -2.2 femoral neck, T score -1.0 lumbar spine  DEXA May 2022: T score -2.1 left total hip, T score -2.2 left femoral neck, normal lumbar spine     Labs July 2023: CBC normal except hemoglobin 9.9 hematocrit 33.1, CMP normal except sodium 134, vitamin B12  1257, ferritin 12, iron 16, TIBC 420, percent saturation 4  Labs 10/23: CRP 0.41 (less than 1), CMP, CBC normal except hemoglobin 11.3  Labs  "January 2024: CMP normal, CRP 0.36 (normal less than 1), CBC normal     Medical problem list:   - RA   - Sanford's esophagus   - Osteopenia- femoral neck T score -2.2 on DEXA June 2019.        ROS:  General: Denies fevers or chills.  CV: Denies chest pain or palpitations.  Denies leg edema.  Lungs: Denies coughing or shortness of breath.  Skin: Denies rashes or nodules.  MS: Denies joint pain or joint swelling.     Objective   /70 (BP Location: Left arm, Patient Position: Sitting, BP Cuff Size: Small adult)   Pulse 77   Temp 36.4 °C (97.6 °F)   Ht 1.765 m (5' 9.5\")   Wt 76.6 kg (168 lb 12.8 oz)   SpO2 99%   BMI 24.57 kg/m²     Physical Exam  HEENT: PERRL, EOMI  Neck: Supple, no nodes.  CV: RRR, no MGR.  Lungs: Clear, no rales or wheezes.  Abdomen: Soft, nontender. No hepatosplenomegaly.  Extremities:  No cyanosis, clubbing, or edema.  MS: Swollen: 0         Tender: 0         Patient global: 1         Evaluator global: 2          CDAI: 3 (low disease activity)            Skin: No nodules or vasculitic lesions. 1.5 cm erythematous patch on anterior scalp with slight scaling-appears consistent with eczema.    Assessment/Plan   Problem List Items Addressed This Visit             ICD-10-CM    Rheumatoid arthritis (Multi) M06.9    Relevant Orders    Comprehensive Metabolic Panel    CBC and Auto Differential    C-Reactive Protein    Postmenopausal estrogen deficiency - Primary Z78.0    Relevant Orders    XR DEXA bone density     Other Visit Diagnoses         Codes    Weight loss     R63.4    Relevant Orders    Tsh With Reflex To Free T4 If Abnormal            1. RA- low disease by CDAI.      2. Osteopenia- DEXA appears stable 5/22.. . Oral bisphosphonate is however now contraindicated because of Sanford's esophagus.       3. Sanford's esophagus- now on omeprazole 40 mg daily.  EGD and colonoscopy were done Oct 25, 2023.     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     5. Anemia-appears consistent with iron deficiency.  " EGD and colonoscopy were done 10/25/23, capsule endoscopy is being done.     6. Vit B12 deficiency-currently on oral vitamin B12. Vitamin B12 was normal January 2023.     7. BMI 21.5-Unintentional weight loss.  She is current on screening exams-colonoscopy and upper endoscopy were done October 2023, mammogram was done September 2023, low-dose chest CT for lung cancer screening was done October 2023.  I recommend check TSH and T4.    8. Smoker-still smoking 11 to 12 cigarettes daily.  Had low-dose chest CT done  Oct 2023 for lung cancer screening.  She did have a 5 mm nodule, was told to repeat in 1 year.    9.  Squamous cell carcinoma excised from her back March 2024.  She has appointment scheduled with Dr. Sharp May 7, 2024.    Plan:  Check labs: CBC with diff, CMP, CRP, TSH.  Keep appoint with Dr. Sharp as scheduled May 7, 2024.  Follow-up in 3 months.

## 2024-05-02 NOTE — PATIENT INSTRUCTIONS
Check labs: CBC with diff, CMP, CRP, TSH.  Keep appoint with Dr. Sharp as scheduled May 7, 2024.  Follow-up in 3 months.

## 2024-05-16 ENCOUNTER — HOSPITAL ENCOUNTER (OUTPATIENT)
Dept: RADIOLOGY | Facility: CLINIC | Age: 69
Discharge: HOME | End: 2024-05-16
Payer: MEDICARE

## 2024-05-16 DIAGNOSIS — Z78.0 POSTMENOPAUSAL ESTROGEN DEFICIENCY: ICD-10-CM

## 2024-05-16 PROCEDURE — 77080 DXA BONE DENSITY AXIAL: CPT

## 2024-06-17 DIAGNOSIS — M06.9 RHEUMATOID ARTHRITIS, UNSPECIFIED (MULTI): ICD-10-CM

## 2024-06-17 RX ORDER — METHOTREXATE 2.5 MG/1
TABLET ORAL
Qty: 32 TABLET | Refills: 2 | Status: SHIPPED | OUTPATIENT
Start: 2024-06-17

## 2024-06-17 RX ORDER — HYDROXYCHLOROQUINE SULFATE 200 MG/1
TABLET, FILM COATED ORAL DAILY
Qty: 90 TABLET | Refills: 2 | Status: SHIPPED | OUTPATIENT
Start: 2024-06-17

## 2024-08-02 ENCOUNTER — APPOINTMENT (OUTPATIENT)
Dept: RHEUMATOLOGY | Facility: CLINIC | Age: 69
End: 2024-08-02
Payer: MEDICARE

## 2024-08-02 ENCOUNTER — LAB (OUTPATIENT)
Dept: LAB | Facility: LAB | Age: 69
End: 2024-08-02
Payer: MEDICARE

## 2024-08-02 VITALS
WEIGHT: 141.1 LBS | TEMPERATURE: 98 F | RESPIRATION RATE: 16 BRPM | BODY MASS INDEX: 20.2 KG/M2 | HEART RATE: 76 BPM | HEIGHT: 70 IN | DIASTOLIC BLOOD PRESSURE: 60 MMHG | SYSTOLIC BLOOD PRESSURE: 110 MMHG | OXYGEN SATURATION: 98 %

## 2024-08-02 DIAGNOSIS — F17.200 TOBACCO DEPENDENCE: ICD-10-CM

## 2024-08-02 DIAGNOSIS — M06.9 RHEUMATOID ARTHRITIS INVOLVING MULTIPLE SITES, UNSPECIFIED WHETHER RHEUMATOID FACTOR PRESENT (MULTI): ICD-10-CM

## 2024-08-02 DIAGNOSIS — Z01.812 PRE-PROCEDURE LAB EXAM: ICD-10-CM

## 2024-08-02 DIAGNOSIS — M06.9 RHEUMATOID ARTHRITIS INVOLVING MULTIPLE SITES, UNSPECIFIED WHETHER RHEUMATOID FACTOR PRESENT (MULTI): Primary | ICD-10-CM

## 2024-08-02 DIAGNOSIS — R63.4 WEIGHT LOSS: ICD-10-CM

## 2024-08-02 PROCEDURE — 86140 C-REACTIVE PROTEIN: CPT

## 2024-08-02 PROCEDURE — 85025 COMPLETE CBC W/AUTO DIFF WBC: CPT

## 2024-08-02 PROCEDURE — 1160F RVW MEDS BY RX/DR IN RCRD: CPT | Performed by: INTERNAL MEDICINE

## 2024-08-02 PROCEDURE — 99214 OFFICE O/P EST MOD 30 MIN: CPT | Performed by: INTERNAL MEDICINE

## 2024-08-02 PROCEDURE — 36415 COLL VENOUS BLD VENIPUNCTURE: CPT

## 2024-08-02 PROCEDURE — 80053 COMPREHEN METABOLIC PANEL: CPT

## 2024-08-02 PROCEDURE — 1159F MED LIST DOCD IN RCRD: CPT | Performed by: INTERNAL MEDICINE

## 2024-08-02 PROCEDURE — 3008F BODY MASS INDEX DOCD: CPT | Performed by: INTERNAL MEDICINE

## 2024-08-02 RX ORDER — VARENICLINE TARTRATE 0.5 MG/1
TABLET, FILM COATED ORAL
Qty: 11 TABLET | Refills: 0 | Status: SHIPPED | OUTPATIENT
Start: 2024-08-02

## 2024-08-02 RX ORDER — VARENICLINE TARTRATE 1 MG/1
1 TABLET, FILM COATED ORAL 2 TIMES DAILY
Qty: 60 TABLET | Refills: 2 | Status: SHIPPED | OUTPATIENT
Start: 2024-08-02 | End: 2024-10-31

## 2024-08-02 ASSESSMENT — PATIENT HEALTH QUESTIONNAIRE - PHQ9
SUM OF ALL RESPONSES TO PHQ9 QUESTIONS 1 AND 2: 0
2. FEELING DOWN, DEPRESSED OR HOPELESS: NOT AT ALL
1. LITTLE INTEREST OR PLEASURE IN DOING THINGS: NOT AT ALL

## 2024-08-02 NOTE — PATIENT INSTRUCTIONS
Call [phone] for assistance with quitting smoking.  Start chantix 0.5 mg days 1-3, then 0,.5 mg 2x daily days 4-7.  Then start chantix 1 mg 2x daily for 12 weeks.  You can either pick a stop date in 1-2 weeks, or you can taper gradually.  Check CT of chest, abdomen, and pelvis.  Follow-up in 3 months.

## 2024-08-02 NOTE — PROGRESS NOTES
Subjective   Patient ID: Tika Deal is a 68 y.o. female who presents for Follow-up (Pt is here due to a 3 month follow up).    HPI 67 yo F here for f/u re: RA.      She remains on methotrexate 20 mg orally weekly , folic acid 1 mg daily. and hydroxychloroquine 200 mg daily.   She is overall doing well.  She is not currently having much joint pain or morning stiffness.  She has not needed to use prednisone since her last visit.     She had eye exam 9/23 by Dr Montes (we are getting report faxed). Includes OCT and 10-2 VF.    She had EGD and colonoscopy 10/23.  Both were unremarkable. She was told to repeat colonoscopy in 7 years,      Had Low dose chest CT was done for lung cancer screening through Dr. Bishop 10/23.  She has one 6 mm nodule, scan will be repeated in 1 year.    She had a skin lesion on her back that was biopsied by Dr. Bishop.  Path returned as squamous cell carcinoma.  She had further excision by Dr. 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 Margins were clear.  She did see Dr. Sharp 5/07/24.  He removed a few other skin lesions that were benign.    She continues to have unintentional weight loss.  She weighed 172 pounds July 2023.  Today she weighs 141 pounds, with BMI 20.5.  She has lost 7 pounds since her last visit May 2024.  She thinks her appetite is good and she does not feel unwell.  She has an occasional cough that she attributes to smoking.  She denies fevers, chills, night sweats, abdominal pain, diarrhea.    She had Pneumovax January 2014, 1/21/22.  She had Prevnar 13 April 7, 2017.     DEXA May 2022: T score -2.1 left total hip, T score -2.2 left femoral neck, normal lumbar spine  DEXA 5/24: T-score -2.4 left total hip ((decline of 4.7%), T-score -2.1 left femoral neck (stable), T-score normal lumbar spine  Estimated 10-year fracture risk by FRAX is 3.5% for hip fracture at 11.7% for major osteoporotic fracture.    "  Labs January 2024: CMP normal, CRP 0.36 (normal less than 1), CBC normal  Labs May 2024: CMP normal, CBC normal, TSH 2.57, CRP 0.79 (less than 1)     Medical problem list:   - RA   - Sanford's esophagus   - Osteopenia- femoral neck T score -2.2 on DEXA June 2019.        ROS:  General: Denies fevers or chills.  CV: Denies chest pain or palpitations.  Denies leg edema.  Lungs: Denies coughing or shortness of breath.  Skin: Denies rashes or nodules.  MS: Denies joint pain or joint swelling.     Objective   /60 (BP Location: Left arm, Patient Position: Sitting, BP Cuff Size: Adult)   Pulse 76   Temp 36.7 °C (98 °F) (Temporal)   Resp 16   Ht 1.765 m (5' 9.5\")   Wt 64 kg (141 lb 1.6 oz)   SpO2 98%   BMI 20.54 kg/m²     Physical Exam  HEENT: PERRL, EOMI  Neck: Supple, no nodes.  CV: RRR, no MGR.  Lungs: Clear, no rales or wheezes.  Abdomen: Soft, nontender. No hepatosplenomegaly.  Extremities:  No cyanosis, clubbing, or edema.  MS: Swollen: 0         Tender: 0         Patient global: 3         Evaluator global: 2          CDAI: 5 (low disease activity)            Skin: No rashes or nodules.    Assessment/Plan   Problem List Items Addressed This Visit             ICD-10-CM    Rheumatoid arthritis (Multi) - Primary M06.9    BMI 20.0-20.9, adult Z68.20    Weight loss R63.4     1. RA- low disease by CDAI.      2. Osteopenia- BMD is slightly worse on bone density from May 2024.  Lowest T-score is minus 2.4 in the left total hip .  Estimated 10-year fracture risk by FRAX is 3.5% for hip fracture and 11.7% for major osteoporotic fracture.  Oral bisphosphonate is however now contraindicated because of Sanford's esophagus.    For now I recommend that she try to get calcium 1200 mg daily, vitamin D at 1000 international units daily.     3. Sanford's esophagus- now on omeprazole 40 mg daily.  EGD and colonoscopy were done Oct 25, 2023.     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     5. Anemia-appears consistent with iron " deficiency.  EGD and colonoscopy were done 10/25/23, capsule endoscopy is being done.     6. Vit B12 deficiency-currently on oral vitamin B12. Vitamin B12 was normal January 2023.     7. BMI 20.5-Unintentional weight loss.  She has lost 31 pounds over the last year. She had colonoscopy and upper endoscopy October 2023, mammogram was done September 2023, low-dose chest CT for lung cancer screening was done October 2023.    I recommend CT of chest, abdomen and pelvis.    8. Smoker-still smoking 11 to 12 cigarettes daily.  Had low-dose chest CT done  Oct 2023 for lung cancer screening.  She did have a 5 mm nodule, was told to repeat in 1 year.  She is interested in quitting smoking.  She has requested Chantix, which was prescribed.  I discussed proper dosing instructions.  She was given phone number [phone] for additional assistance to quit smoking.    9.  Squamous cell carcinoma excised from her back March 2024.      Plan:  Call [phone] for assistance with quitting smoking.  Start chantix 0.5 mg days 1-3, then 0,.5 mg 2x daily days 4-7.  Then start chantix 1 mg 2x daily for 12 weeks.  You can either pick a stop date in 1-2 weeks, or you can taper gradually.  Check CT of chest, abdomen, and pelvis.  Follow-up in 3 months.

## 2024-08-03 LAB
ALBUMIN SERPL BCP-MCNC: 4.1 G/DL (ref 3.4–5)
ALP SERPL-CCNC: 75 U/L (ref 33–136)
ALT SERPL W P-5'-P-CCNC: 12 U/L (ref 7–45)
ANION GAP SERPL CALC-SCNC: 14 MMOL/L (ref 10–20)
AST SERPL W P-5'-P-CCNC: 19 U/L (ref 9–39)
BASOPHILS # BLD AUTO: 0.05 X10*3/UL (ref 0–0.1)
BASOPHILS NFR BLD AUTO: 0.6 %
BILIRUB SERPL-MCNC: 0.4 MG/DL (ref 0–1.2)
BUN SERPL-MCNC: 9 MG/DL (ref 6–23)
CALCIUM SERPL-MCNC: 9.9 MG/DL (ref 8.6–10.6)
CHLORIDE SERPL-SCNC: 102 MMOL/L (ref 98–107)
CO2 SERPL-SCNC: 28 MMOL/L (ref 21–32)
CREAT SERPL-MCNC: 0.99 MG/DL (ref 0.5–1.05)
CRP SERPL-MCNC: 0.33 MG/DL
EGFRCR SERPLBLD CKD-EPI 2021: 63 ML/MIN/1.73M*2
EOSINOPHIL # BLD AUTO: 0.1 X10*3/UL (ref 0–0.7)
EOSINOPHIL NFR BLD AUTO: 1.1 %
ERYTHROCYTE [DISTWIDTH] IN BLOOD BY AUTOMATED COUNT: 18.8 % (ref 11.5–14.5)
GLUCOSE SERPL-MCNC: 94 MG/DL (ref 74–99)
HCT VFR BLD AUTO: 37.9 % (ref 36–46)
HGB BLD-MCNC: 12.1 G/DL (ref 12–16)
IMM GRANULOCYTES # BLD AUTO: 0.02 X10*3/UL (ref 0–0.7)
IMM GRANULOCYTES NFR BLD AUTO: 0.2 % (ref 0–0.9)
LYMPHOCYTES # BLD AUTO: 2.2 X10*3/UL (ref 1.2–4.8)
LYMPHOCYTES NFR BLD AUTO: 25 %
MCH RBC QN AUTO: 28.9 PG (ref 26–34)
MCHC RBC AUTO-ENTMCNC: 31.9 G/DL (ref 32–36)
MCV RBC AUTO: 91 FL (ref 80–100)
MONOCYTES # BLD AUTO: 0.66 X10*3/UL (ref 0.1–1)
MONOCYTES NFR BLD AUTO: 7.5 %
NEUTROPHILS # BLD AUTO: 5.78 X10*3/UL (ref 1.2–7.7)
NEUTROPHILS NFR BLD AUTO: 65.6 %
NRBC BLD-RTO: 0 /100 WBCS (ref 0–0)
PLATELET # BLD AUTO: 302 X10*3/UL (ref 150–450)
POTASSIUM SERPL-SCNC: 4.9 MMOL/L (ref 3.5–5.3)
PROT SERPL-MCNC: 6.8 G/DL (ref 6.4–8.2)
RBC # BLD AUTO: 4.19 X10*6/UL (ref 4–5.2)
SODIUM SERPL-SCNC: 139 MMOL/L (ref 136–145)
WBC # BLD AUTO: 8.8 X10*3/UL (ref 4.4–11.3)

## 2024-08-04 DIAGNOSIS — K22.70 BARRETT'S ESOPHAGUS WITHOUT DYSPLASIA: ICD-10-CM

## 2024-08-05 ENCOUNTER — HOSPITAL ENCOUNTER (OUTPATIENT)
Dept: RADIOLOGY | Facility: CLINIC | Age: 69
Discharge: HOME | End: 2024-08-05
Payer: MEDICARE

## 2024-08-05 DIAGNOSIS — R63.4 WEIGHT LOSS: ICD-10-CM

## 2024-08-05 PROCEDURE — 2550000001 HC RX 255 CONTRASTS: Performed by: INTERNAL MEDICINE

## 2024-08-05 PROCEDURE — 74177 CT ABD & PELVIS W/CONTRAST: CPT

## 2024-08-05 PROCEDURE — 74177 CT ABD & PELVIS W/CONTRAST: CPT | Performed by: RADIOLOGY

## 2024-08-05 PROCEDURE — 71260 CT THORAX DX C+: CPT | Performed by: RADIOLOGY

## 2024-08-05 RX ORDER — OMEPRAZOLE 40 MG/1
40 CAPSULE, DELAYED RELEASE ORAL DAILY
Qty: 30 CAPSULE | Refills: 5 | Status: SHIPPED | OUTPATIENT
Start: 2024-08-05

## 2024-08-12 DIAGNOSIS — R93.89 THICKENED ENDOMETRIUM: Primary | ICD-10-CM

## 2024-08-12 DIAGNOSIS — M06.9 RHEUMATOID ARTHRITIS, UNSPECIFIED (MULTI): ICD-10-CM

## 2024-08-13 ENCOUNTER — TELEPHONE (OUTPATIENT)
Dept: PRIMARY CARE | Facility: CLINIC | Age: 69
End: 2024-08-13
Payer: MEDICARE

## 2024-08-13 RX ORDER — METHOTREXATE 2.5 MG/1
TABLET ORAL
Qty: 32 TABLET | Refills: 2 | Status: SHIPPED | OUTPATIENT
Start: 2024-08-13

## 2024-08-20 ENCOUNTER — HOSPITAL ENCOUNTER (OUTPATIENT)
Dept: RADIOLOGY | Facility: CLINIC | Age: 69
Discharge: HOME | End: 2024-08-20
Payer: MEDICARE

## 2024-08-20 DIAGNOSIS — R93.89 THICKENED ENDOMETRIUM: ICD-10-CM

## 2024-08-20 PROCEDURE — 76830 TRANSVAGINAL US NON-OB: CPT | Performed by: RADIOLOGY

## 2024-08-20 PROCEDURE — 76856 US EXAM PELVIC COMPLETE: CPT

## 2024-08-20 PROCEDURE — 76857 US EXAM PELVIC LIMITED: CPT | Performed by: RADIOLOGY

## 2024-08-26 ENCOUNTER — TELEPHONE (OUTPATIENT)
Dept: PRIMARY CARE | Facility: CLINIC | Age: 69
End: 2024-08-26
Payer: MEDICARE

## 2024-08-26 NOTE — TELEPHONE ENCOUNTER
Pt would like to know the results of the transvaginal ultrasound and what the next step would be. Please call

## 2024-08-27 DIAGNOSIS — R93.89 ENDOMETRIAL THICKENING ON ULTRASOUND: Primary | ICD-10-CM

## 2024-08-27 NOTE — TELEPHONE ENCOUNTER
Discussed. Recommend gynecology appointment for possible endometrial polyp.. Will also need follow up ultrasound in 3 months to check small right ovarian cyst.  She is going to try to contact her previous gynecologist. If she is not available, she will see  gynecologist (referral placed).  Dr. Orosco

## 2024-11-11 ENCOUNTER — APPOINTMENT (OUTPATIENT)
Dept: RHEUMATOLOGY | Facility: CLINIC | Age: 69
End: 2024-11-11
Payer: MEDICARE

## 2024-11-11 ENCOUNTER — LAB (OUTPATIENT)
Dept: LAB | Facility: LAB | Age: 69
End: 2024-11-11
Payer: MEDICARE

## 2024-11-11 VITALS
RESPIRATION RATE: 16 BRPM | WEIGHT: 147.6 LBS | OXYGEN SATURATION: 99 % | BODY MASS INDEX: 21.13 KG/M2 | HEART RATE: 60 BPM | DIASTOLIC BLOOD PRESSURE: 86 MMHG | SYSTOLIC BLOOD PRESSURE: 130 MMHG | HEIGHT: 70 IN | TEMPERATURE: 98.6 F

## 2024-11-11 DIAGNOSIS — M06.9 RHEUMATOID ARTHRITIS INVOLVING MULTIPLE SITES, UNSPECIFIED WHETHER RHEUMATOID FACTOR PRESENT (MULTI): ICD-10-CM

## 2024-11-11 DIAGNOSIS — M06.9 RHEUMATOID ARTHRITIS INVOLVING MULTIPLE SITES, UNSPECIFIED WHETHER RHEUMATOID FACTOR PRESENT (MULTI): Primary | ICD-10-CM

## 2024-11-11 PROCEDURE — 85025 COMPLETE CBC W/AUTO DIFF WBC: CPT

## 2024-11-11 PROCEDURE — 86140 C-REACTIVE PROTEIN: CPT

## 2024-11-11 PROCEDURE — 1160F RVW MEDS BY RX/DR IN RCRD: CPT | Performed by: INTERNAL MEDICINE

## 2024-11-11 PROCEDURE — 1126F AMNT PAIN NOTED NONE PRSNT: CPT | Performed by: INTERNAL MEDICINE

## 2024-11-11 PROCEDURE — 36415 COLL VENOUS BLD VENIPUNCTURE: CPT

## 2024-11-11 PROCEDURE — 1159F MED LIST DOCD IN RCRD: CPT | Performed by: INTERNAL MEDICINE

## 2024-11-11 PROCEDURE — G0008 ADMIN INFLUENZA VIRUS VAC: HCPCS | Performed by: INTERNAL MEDICINE

## 2024-11-11 PROCEDURE — 80053 COMPREHEN METABOLIC PANEL: CPT

## 2024-11-11 PROCEDURE — 99214 OFFICE O/P EST MOD 30 MIN: CPT | Performed by: INTERNAL MEDICINE

## 2024-11-11 PROCEDURE — 90662 IIV NO PRSV INCREASED AG IM: CPT | Performed by: INTERNAL MEDICINE

## 2024-11-11 PROCEDURE — 3008F BODY MASS INDEX DOCD: CPT | Performed by: INTERNAL MEDICINE

## 2024-11-11 ASSESSMENT — PATIENT HEALTH QUESTIONNAIRE - PHQ9
SUM OF ALL RESPONSES TO PHQ9 QUESTIONS 1 AND 2: 0
1. LITTLE INTEREST OR PLEASURE IN DOING THINGS: NOT AT ALL
2. FEELING DOWN, DEPRESSED OR HOPELESS: NOT AT ALL

## 2024-11-11 ASSESSMENT — PAIN SCALES - GENERAL: PAINLEVEL_OUTOF10: 0-NO PAIN

## 2024-11-11 NOTE — PATIENT INSTRUCTIONS
You are due for follow up eye exam.  Check labs: CBC with diff, CMP, CRP.  High dose flu shot was given today. Skip your next methotrexate dose, to get better response.  Follow-up in 3 months.

## 2024-11-11 NOTE — PROGRESS NOTES
Subjective   Patient ID: Tika Deal is a 69 y.o. female who presents for Follow-up (3 month f/u).    HPI 70 yo F here for f/u re: RA.      She remains on methotrexate 20 mg orally weekly , folic acid 1 mg daily. and hydroxychloroquine 200 mg daily.   She is overall doing well.  She is not currently having much joint pain or morning stiffness.  She has not needed to use prednisone since her last visit.     She had eye exam 9/23 by Dr Montes (we are getting report faxed). Includes OCT and 10-2 VF.    Her weight has stabilized, and she has actually gained 6 pounds since her last appointment.    She did see a gynecologist regarding endometrial thickening noted on CT of abdomen pelvis done August 6, 2024.  Pelvic ultrasound August 2024 showed fluid-filled endometrium and possible endometrial polyps.  2.3 cm right ovarian cyst was also noted.  She was also seen by gynecologist Dr. Laura Mason.  She had a hysteroscopy done October 10, 2024.  She was noted to have a stenotic cervical os, and fluid in the uterus.  Thick yellowish-brown mucus came out of the cervix when the external os was dilated.  Hysteroscopy was otherwise normal, with thin endometrium, normal endometrial cavity, and no polyps or fibroids.    She had a skin lesion on her back that was biopsied by Dr. Bishop.  Path returned as squamous cell carcinoma.  She had further excision by Dr. 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 Margins were clear.  She did see Dr. Sharp 5/07/24.  He removed a few other skin lesions that were benign.  She had follow-up visit with dermatology, and no other suspicious skin lesions were seen.    DEXA May 2022: T score -2.1 left total hip, T score -2.2 left femoral neck, normal lumbar spine  DEXA 5/24: T-score -2.4 left total hip ((decline of 4.7%), T-score -2.1 left femoral neck (stable), T-score normal lumbar spine  Estimated 10-year fracture risk by FRAX is  "3.5% for hip fracture at 11.7% for major osteoporotic fracture.     Labs January 2024: CMP normal, CRP 0.36 (normal less than 1), CBC normal  Labs May 2024: CMP normal, CBC normal, TSH 2.57, CRP 0.79 (less than 1)  Labs August 2024: CBC normal, CMP normal, CRP 0.33 (normal less than 1)    CT of chest, abdomen pelvis August 2024:  Moderate centrilobular emphysema, peripheral and biapical scarring in the lungs bilaterally, multiple scattered small nodules that were 4 mm or less.  Recommend repeat chest CT in 1 year (continues to smoke).  Also noted was endometrial thickening.    Pelvic ultrasound 8/24 showed fluid-filled endometrium, and right ovarian cyst which was 2.3 x 2.2 cm.       Medical problem list:   - RA   - Sanford's esophagus   - Osteopenia- femoral neck T score -2.2 on DEXA June 2019.    Health maintenance:  Prevnar 13 April 2017  Pneumovax January 2022.  Colonoscopy October 2023-7-year follow-up recommended  EGD October 2023-unremarkable        ROS:  General: Denies fevers or chills.  CV: Denies chest pain or palpitations.  Denies leg edema.  Lungs: Denies coughing or shortness of breath.  Skin: Denies rashes or nodules.  MS: Denies joint pain or joint swelling.     Objective   Visit Vitals  /86 (BP Location: Left arm, Patient Position: Sitting, BP Cuff Size: Adult)   Pulse 60   Temp 37 °C (98.6 °F) (Skin)   Resp 16   Ht 1.765 m (5' 9.5\")   Wt 67 kg (147 lb 9.6 oz)   SpO2 99%   BMI 21.48 kg/m²   OB Status Postmenopausal   Smoking Status Every Day   BSA 1.81 m²        Physical Exam  General appearance: Well-nourished and well-appearing.  HEENT: PERRL, EOMI  Neck: Supple, no nodes.  CV: RRR, no MGR.  Lungs: Clear, no rales or wheezes.  Abdomen: Soft, nontender. No hepatosplenomegaly.  Extremities:  No cyanosis, clubbing, or edema.  MS: Swollen: 0         Tender: 0         Patient global: 1         Evaluator global: 1          CDAI: 2 (remission)            Skin: No rashes or " nodules.    Assessment/Plan   Problem List Items Addressed This Visit             ICD-10-CM    Rheumatoid arthritis - Primary M06.9    Relevant Orders    CBC and Auto Differential    Comprehensive Metabolic Panel    C-Reactive Protein    Flu vaccine, trivalent, preservative free, HIGH-DOSE, age 65y+ (Fluzone)         1. RA-remission by CDAI.  (0 swollen, 0 tender joints)     2. Osteopenia- BMD is slightly worse on bone density from May 2024.  Lowest T-score is minus 2.4 in the left total hip .  Estimated 10-year fracture risk by FRAX is 3.5% for hip fracture and 11.7% for major osteoporotic fracture.  Oral bisphosphonate is however now contraindicated because of Sanford's esophagus.    For now I recommend that she try to get calcium 1200 mg daily, vitamin D at 1000 international units daily.     3. Sanford's esophagus- now on omeprazole 40 mg daily.  EGD and colonoscopy were done Oct 25, 2023.     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     5. Vit B12 deficiency-currently on oral vitamin B12. Vitamin B12 was normal January 2023.     6. BMI 21.5-Unintentional weight loss.  Fortunately weight has stabilized, and she has actually regained 6 pounds.  Extensive evaluation found no other etiology of weight loss.    7. Smoker-still smoking 11 to 12 cigarettes daily.  Had low-dose chest CT done  Oct 2023 for lung cancer screening.  She is cutting back on her smoking.  She has smoked 4 packs since September 21, 2024.  She was previously smoking 1 pack/day.  She has stopped smoking for short periods of time then resumed smoking.  She has decided to not take Chantix, but will continue to try to cut back on smoking on her own.  Low-dose chest CT will be needed 8/25.     8.  Squamous cell carcinoma excised from her back March 2024.  She had recent visit with dermatology, no other suspicious lesions were seen.    Plan:  You are due for follow up eye exam.  Check labs: CBC with diff, CMP, CRP.  High dose flu shot was given today. Skip  your next methotrexate dose, to get better response.  Follow-up in 3 months.

## 2024-11-12 LAB
ALBUMIN SERPL BCP-MCNC: 3.8 G/DL (ref 3.4–5)
ALP SERPL-CCNC: 76 U/L (ref 33–136)
ALT SERPL W P-5'-P-CCNC: 13 U/L (ref 7–45)
ANION GAP SERPL CALC-SCNC: 12 MMOL/L (ref 10–20)
AST SERPL W P-5'-P-CCNC: 20 U/L (ref 9–39)
BASOPHILS # BLD AUTO: 0.05 X10*3/UL (ref 0–0.1)
BASOPHILS NFR BLD AUTO: 0.5 %
BILIRUB SERPL-MCNC: 0.5 MG/DL (ref 0–1.2)
BUN SERPL-MCNC: 11 MG/DL (ref 6–23)
CALCIUM SERPL-MCNC: 9.2 MG/DL (ref 8.6–10.6)
CHLORIDE SERPL-SCNC: 103 MMOL/L (ref 98–107)
CO2 SERPL-SCNC: 29 MMOL/L (ref 21–32)
CREAT SERPL-MCNC: 1.07 MG/DL (ref 0.5–1.05)
CRP SERPL-MCNC: 0.14 MG/DL
EGFRCR SERPLBLD CKD-EPI 2021: 57 ML/MIN/1.73M*2
EOSINOPHIL # BLD AUTO: 0.18 X10*3/UL (ref 0–0.7)
EOSINOPHIL NFR BLD AUTO: 1.8 %
ERYTHROCYTE [DISTWIDTH] IN BLOOD BY AUTOMATED COUNT: 17.5 % (ref 11.5–14.5)
GLUCOSE SERPL-MCNC: 83 MG/DL (ref 74–99)
HCT VFR BLD AUTO: 38.7 % (ref 36–46)
HGB BLD-MCNC: 12.1 G/DL (ref 12–16)
IMM GRANULOCYTES # BLD AUTO: 0.03 X10*3/UL (ref 0–0.7)
IMM GRANULOCYTES NFR BLD AUTO: 0.3 % (ref 0–0.9)
LYMPHOCYTES # BLD AUTO: 1.85 X10*3/UL (ref 1.2–4.8)
LYMPHOCYTES NFR BLD AUTO: 18.8 %
MCH RBC QN AUTO: 29.6 PG (ref 26–34)
MCHC RBC AUTO-ENTMCNC: 31.3 G/DL (ref 32–36)
MCV RBC AUTO: 95 FL (ref 80–100)
MONOCYTES # BLD AUTO: 0.73 X10*3/UL (ref 0.1–1)
MONOCYTES NFR BLD AUTO: 7.4 %
NEUTROPHILS # BLD AUTO: 6.99 X10*3/UL (ref 1.2–7.7)
NEUTROPHILS NFR BLD AUTO: 71.2 %
NRBC BLD-RTO: 0 /100 WBCS (ref 0–0)
PLATELET # BLD AUTO: 241 X10*3/UL (ref 150–450)
POTASSIUM SERPL-SCNC: 4.3 MMOL/L (ref 3.5–5.3)
PROT SERPL-MCNC: 6.9 G/DL (ref 6.4–8.2)
RBC # BLD AUTO: 4.09 X10*6/UL (ref 4–5.2)
SODIUM SERPL-SCNC: 140 MMOL/L (ref 136–145)
WBC # BLD AUTO: 9.8 X10*3/UL (ref 4.4–11.3)

## 2024-11-28 DIAGNOSIS — M06.9 RHEUMATOID ARTHRITIS, UNSPECIFIED: ICD-10-CM

## 2024-12-02 RX ORDER — METHOTREXATE 2.5 MG/1
20 TABLET ORAL WEEKLY
Qty: 32 TABLET | Refills: 2 | Status: SHIPPED | OUTPATIENT
Start: 2024-12-02

## 2025-02-11 ENCOUNTER — APPOINTMENT (OUTPATIENT)
Dept: RHEUMATOLOGY | Facility: CLINIC | Age: 70
End: 2025-02-11
Payer: MEDICARE

## 2025-02-11 ENCOUNTER — HOSPITAL ENCOUNTER (OUTPATIENT)
Dept: RADIOLOGY | Facility: CLINIC | Age: 70
Discharge: HOME | End: 2025-02-11
Payer: MEDICARE

## 2025-02-11 VITALS
HEART RATE: 67 BPM | WEIGHT: 152.6 LBS | RESPIRATION RATE: 14 BRPM | OXYGEN SATURATION: 97 % | DIASTOLIC BLOOD PRESSURE: 80 MMHG | HEIGHT: 70 IN | BODY MASS INDEX: 21.85 KG/M2 | TEMPERATURE: 99.5 F | SYSTOLIC BLOOD PRESSURE: 134 MMHG

## 2025-02-11 DIAGNOSIS — M06.9 RHEUMATOID ARTHRITIS INVOLVING MULTIPLE SITES, UNSPECIFIED WHETHER RHEUMATOID FACTOR PRESENT (MULTI): ICD-10-CM

## 2025-02-11 DIAGNOSIS — M54.2 NECK PAIN: Primary | ICD-10-CM

## 2025-02-11 DIAGNOSIS — M54.2 NECK PAIN: ICD-10-CM

## 2025-02-11 DIAGNOSIS — Z12.31 VISIT FOR SCREENING MAMMOGRAM: ICD-10-CM

## 2025-02-11 PROCEDURE — 1160F RVW MEDS BY RX/DR IN RCRD: CPT | Performed by: INTERNAL MEDICINE

## 2025-02-11 PROCEDURE — 72050 X-RAY EXAM NECK SPINE 4/5VWS: CPT

## 2025-02-11 PROCEDURE — 1125F AMNT PAIN NOTED PAIN PRSNT: CPT | Performed by: INTERNAL MEDICINE

## 2025-02-11 PROCEDURE — 1159F MED LIST DOCD IN RCRD: CPT | Performed by: INTERNAL MEDICINE

## 2025-02-11 PROCEDURE — 99214 OFFICE O/P EST MOD 30 MIN: CPT | Performed by: INTERNAL MEDICINE

## 2025-02-11 PROCEDURE — 3008F BODY MASS INDEX DOCD: CPT | Performed by: INTERNAL MEDICINE

## 2025-02-11 ASSESSMENT — PAIN SCALES - GENERAL: PAINLEVEL_OUTOF10: 2

## 2025-02-11 NOTE — PROGRESS NOTES
Subjective   Patient ID: Tika Deal is a 69 y.o. female who presents for Follow-up (3 month f/u).    HPI 68 yo F here for f/u re: RA.      She remains on methotrexate 20 mg orally weekly , folic acid 1 mg daily. and hydroxychloroquine 200 mg daily.   She is overall doing well.  She is not currently having much joint pain or morning stiffness.  She has not needed to use prednisone since her last visit.    She does note some neck pain.  She denies numbness or tingling in her arms.     She had eye exam 2/06/25 at [facility].  She had another eye exam by Dr. Cady Fuentes 2/06/25- no hydroxychloroquine retinopathy.    Her weight has stabilized, and she has actually gained 6 pounds since her last appointment.    She had a skin lesion on her back that was biopsied by Dr. Bishop.  Path returned as squamous cell carcinoma.  She had further excision by Dr. 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 Margins were clear.  She did see Dr. Sharp 5/07/24.  He removed a few other skin lesions that were benign.  She had follow-up visit with dermatology, and no other suspicious skin lesions were seen.    DEXA May 2022: T score -2.1 left total hip, T score -2.2 left femoral neck, normal lumbar spine  DEXA 5/24: T-score -2.4 left total hip ((decline of 4.7%), T-score -2.1 left femoral neck (stable), T-score normal lumbar spine  Estimated 10-year fracture risk by FRAX is 3.5% for hip fracture at 11.7% for major osteoporotic fracture.     Labs January 2024: CMP normal, CRP 0.36 (normal less than 1), CBC normal  Labs May 2024: CMP normal, CBC normal, TSH 2.57, CRP 0.79 (less than 1)  Labs August 2024: CBC normal, CMP normal, CRP 0.33 (normal less than 1)  Labs November 2024: CRP 0.14 (normal less than 1), CMP normal except creatinine 1.07 (GFR 57), CBC normal    CT of chest, abdomen pelvis August 2024:  Moderate centrilobular emphysema, peripheral and  "biapical scarring in the lungs bilaterally, multiple scattered small nodules that were 4 mm or less.  Recommend repeat chest CT in 1 year (continues to smoke).  Also noted was endometrial thickening.    Pelvic ultrasound 8/24 showed fluid-filled endometrium, and right ovarian cyst which was 2.3 x 2.2 cm.       Medical problem list:   - RA   - Sanford's esophagus   - Osteopenia- femoral neck T score -2.2 on DEXA June 2019.    Health maintenance:  Prevnar 13 April 2017  Pneumovax January 2022.  Colonoscopy October 2023-7-year follow-up recommended  EGD October 2023-unremarkable        ROS:  General: Denies fevers or chills.  CV: Denies chest pain or palpitations.  Denies leg edema.  Lungs: Denies coughing or shortness of breath.  Skin: Denies rashes or nodules.  MS: Denies joint pain or joint swelling.     Objective   Visit Vitals  /80 (BP Location: Left arm, Patient Position: Sitting, BP Cuff Size: Adult)   Pulse 67   Temp 37.5 °C (99.5 °F) (Skin)   Resp 14   Ht 1.765 m (5' 9.5\")   Wt 69.2 kg (152 lb 9.6 oz)   SpO2 97%   BMI 22.21 kg/m²   OB Status Postmenopausal   Smoking Status Some Days   BSA 1.84 m²        Physical Exam  General appearance: Well-nourished and well-appearing.  HEENT: PERRL, EOMI  Neck: Supple, no nodes.  CV: RRR, no MGR.  Lungs: Clear, no rales or wheezes.  Abdomen: Soft, nontender. No hepatosplenomegaly.  Extremities:  No cyanosis, clubbing, or edema.  MS: Swollen: 0         Tender: 0         Patient global: 4         Evaluator global: 3          CDAI: 7 (low disease activity)            Skin: No rashes or nodules.    Assessment/Plan           1. RA-low disease activity  by CDAI.  (0 swollen, 0 tender joints)     2. Osteopenia- BMD is slightly worse on bone density from May 2024.  Lowest T-score is minus 2.4 in the left total hip .  Estimated 10-year fracture risk by FRAX is 3.5% for hip fracture and 11.7% for major osteoporotic fracture.  Oral bisphosphonate is however now contraindicated " because of Sanford's esophagus.    For now I recommend that she try to get calcium 1200 mg daily, vitamin D at 1000 international units daily.     3. Sanford's esophagus- now on omeprazole 40 mg daily.  EGD and colonoscopy were done Oct 25, 2023.     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     5. Vit B12 deficiency-currently on oral vitamin B12. Vitamin B12 was normal January 2023.     6. BMI 22-  weight has stabilized, and she has actually regained 6 pounds.  Extensive evaluation found no other etiology of weight loss.    7. Smoker-still smoking 11 to 12 cigarettes daily.  Had low-dose chest CT done  Oct 2023 for lung cancer screening.  She is cutting back on her smoking.  She has smoked 4 packs since September 21, 2024.  She was previously smoking 1 pack/day.  She has stopped smoking for short periods of time then resumed smoking.  She has decided to not take Chantix, but will continue to try to cut back on smoking on her own.  Low-dose chest CT will be needed 8/25.     8.  Squamous cell carcinoma excised from her back March 2024.  She had recent visit with dermatology, no other suspicious lesions were seen.    Plan:  Check CBC with diff, CMP, CRP.  Cervical spine Xray ordered.  Follow-up in 3 months.

## 2025-02-12 ENCOUNTER — HOSPITAL ENCOUNTER (OUTPATIENT)
Dept: RADIOLOGY | Facility: CLINIC | Age: 70
Discharge: HOME | End: 2025-02-12
Payer: MEDICARE

## 2025-02-12 DIAGNOSIS — Z12.31 VISIT FOR SCREENING MAMMOGRAM: ICD-10-CM

## 2025-02-12 LAB
ALBUMIN SERPL-MCNC: 4.2 G/DL (ref 3.6–5.1)
ALP SERPL-CCNC: 85 U/L (ref 37–153)
ALT SERPL-CCNC: 26 U/L (ref 6–29)
ANION GAP SERPL CALCULATED.4IONS-SCNC: 9 MMOL/L (CALC) (ref 7–17)
AST SERPL-CCNC: 35 U/L (ref 10–35)
BASOPHILS # BLD AUTO: 50 CELLS/UL (ref 0–200)
BASOPHILS NFR BLD AUTO: 0.5 %
BILIRUB SERPL-MCNC: 0.5 MG/DL (ref 0.2–1.2)
BUN SERPL-MCNC: 13 MG/DL (ref 7–25)
CALCIUM SERPL-MCNC: 9.2 MG/DL (ref 8.6–10.4)
CHLORIDE SERPL-SCNC: 102 MMOL/L (ref 98–110)
CO2 SERPL-SCNC: 28 MMOL/L (ref 20–32)
CREAT SERPL-MCNC: 1.03 MG/DL (ref 0.5–1.05)
CRP SERPL-MCNC: 4.5 MG/L
EGFRCR SERPLBLD CKD-EPI 2021: 59 ML/MIN/1.73M2
EOSINOPHIL # BLD AUTO: 139 CELLS/UL (ref 15–500)
EOSINOPHIL NFR BLD AUTO: 1.4 %
ERYTHROCYTE [DISTWIDTH] IN BLOOD BY AUTOMATED COUNT: 15.4 % (ref 11–15)
GLUCOSE SERPL-MCNC: 74 MG/DL (ref 65–139)
HCT VFR BLD AUTO: 39.2 % (ref 35–45)
HGB BLD-MCNC: 12.8 G/DL (ref 11.7–15.5)
LYMPHOCYTES # BLD AUTO: 2089 CELLS/UL (ref 850–3900)
LYMPHOCYTES NFR BLD AUTO: 21.1 %
MCH RBC QN AUTO: 29.6 PG (ref 27–33)
MCHC RBC AUTO-ENTMCNC: 32.7 G/DL (ref 32–36)
MCV RBC AUTO: 90.7 FL (ref 80–100)
MONOCYTES # BLD AUTO: 931 CELLS/UL (ref 200–950)
MONOCYTES NFR BLD AUTO: 9.4 %
NEUTROPHILS # BLD AUTO: 6692 CELLS/UL (ref 1500–7800)
NEUTROPHILS NFR BLD AUTO: 67.6 %
PLATELET # BLD AUTO: 261 THOUSAND/UL (ref 140–400)
PMV BLD REES-ECKER: 12.3 FL (ref 7.5–12.5)
POTASSIUM SERPL-SCNC: 4.4 MMOL/L (ref 3.5–5.3)
PROT SERPL-MCNC: 7 G/DL (ref 6.1–8.1)
RBC # BLD AUTO: 4.32 MILLION/UL (ref 3.8–5.1)
SODIUM SERPL-SCNC: 139 MMOL/L (ref 135–146)
WBC # BLD AUTO: 9.9 THOUSAND/UL (ref 3.8–10.8)

## 2025-02-12 PROCEDURE — 77067 SCR MAMMO BI INCL CAD: CPT

## 2025-02-17 ENCOUNTER — HOSPITAL ENCOUNTER (OUTPATIENT)
Dept: RADIOLOGY | Facility: EXTERNAL LOCATION | Age: 70
Discharge: HOME | End: 2025-02-17
Payer: MEDICARE

## 2025-03-06 DIAGNOSIS — K22.70 BARRETT'S ESOPHAGUS WITHOUT DYSPLASIA: ICD-10-CM

## 2025-03-06 RX ORDER — OMEPRAZOLE 40 MG/1
40 CAPSULE, DELAYED RELEASE ORAL DAILY
Qty: 30 CAPSULE | Refills: 5 | Status: SHIPPED | OUTPATIENT
Start: 2025-03-06

## 2025-03-14 DIAGNOSIS — M06.9 RHEUMATOID ARTHRITIS, UNSPECIFIED: ICD-10-CM

## 2025-03-17 RX ORDER — METHOTREXATE 2.5 MG/1
20 TABLET ORAL
Qty: 32 TABLET | Refills: 2 | Status: SHIPPED | OUTPATIENT
Start: 2025-03-23

## 2025-05-12 ENCOUNTER — APPOINTMENT (OUTPATIENT)
Dept: RHEUMATOLOGY | Facility: CLINIC | Age: 70
End: 2025-05-12
Payer: MEDICARE

## 2025-05-12 VITALS
BODY MASS INDEX: 21.05 KG/M2 | HEART RATE: 67 BPM | WEIGHT: 147 LBS | DIASTOLIC BLOOD PRESSURE: 80 MMHG | RESPIRATION RATE: 16 BRPM | HEIGHT: 70 IN | OXYGEN SATURATION: 98 % | TEMPERATURE: 98.3 F | SYSTOLIC BLOOD PRESSURE: 142 MMHG

## 2025-05-12 DIAGNOSIS — M06.9 RHEUMATOID ARTHRITIS INVOLVING MULTIPLE SITES, UNSPECIFIED WHETHER RHEUMATOID FACTOR PRESENT (MULTI): Primary | ICD-10-CM

## 2025-05-12 PROCEDURE — 1159F MED LIST DOCD IN RCRD: CPT | Performed by: INTERNAL MEDICINE

## 2025-05-12 PROCEDURE — 1125F AMNT PAIN NOTED PAIN PRSNT: CPT | Performed by: INTERNAL MEDICINE

## 2025-05-12 PROCEDURE — 3008F BODY MASS INDEX DOCD: CPT | Performed by: INTERNAL MEDICINE

## 2025-05-12 PROCEDURE — 1160F RVW MEDS BY RX/DR IN RCRD: CPT | Performed by: INTERNAL MEDICINE

## 2025-05-12 PROCEDURE — 99214 OFFICE O/P EST MOD 30 MIN: CPT | Performed by: INTERNAL MEDICINE

## 2025-05-12 ASSESSMENT — PAIN SCALES - GENERAL: PAINLEVEL_OUTOF10: 2

## 2025-05-12 NOTE — PROGRESS NOTES
Subjective   Patient ID: Tika Deal is a 69 y.o. female who presents for Follow-up.    HPI 70 yo F here for f/u re: RA.      She remains on methotrexate 20 mg orally weekly , folic acid 800 mcg daily. and hydroxychloroquine 200 mg daily.   She is overall doing well.  She is not currently having much joint pain or morning stiffness.  She has not needed to use prednisone since her last visit.    She does note some  intermittent neck pain.  She denies numbness or tingling in her arms.  Cervical spine x-ray February 2025 showed degenerative disc disease at C6-7, grade 1 anterolisthesis of C2 on C3, grade 1 anterolisthesis of C3 on C4, grade 1 anterolisthesis of C4 and C5 (on flexion only).     She had eye exam 2/06/25 at [facility].  She had another eye exam by Dr. Cady Fuentes 2/06/25- no hydroxychloroquine retinopathy.    She had a skin lesion on her back that was biopsied by Dr. Bishop.  Path returned as squamous cell carcinoma.  She had further excision by Dr. 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 Margins were clear.  She did see Dr. Sharp 5/07/24.  He removed a few other skin lesions that were benign.  She had follow-up visit with dermatology, and no other suspicious skin lesions were seen.    DEXA May 2022: T score -2.1 left total hip, T score -2.2 left femoral neck, normal lumbar spine  DEXA 5/24: T-score -2.4 left total hip ((decline of 4.7%), T-score -2.1 left femoral neck (stable), T-score normal lumbar spine  Estimated 10-year fracture risk by FRAX is 3.5% for hip fracture at 11.7% for major osteoporotic fracture.     Labs January 2024: CMP normal, CRP 0.36 (normal less than 1), CBC normal  Labs May 2024: CMP normal, CBC normal, TSH 2.57, CRP 0.79 (less than 1)  Labs August 2024: CBC normal, CMP normal, CRP 0.33 (normal less than 1)  Labs November 2024: CRP 0.14 (normal less than 1), CMP normal except creatinine 1.07  "(GFR 57), CBC normal  Labs February 2025: CMP normal except GFR 59, CBC normal, CRP 4.5 (normal less than 8)    CT of chest, abdomen pelvis August 2024:  Moderate centrilobular emphysema, peripheral and biapical scarring in the lungs bilaterally, multiple scattered small nodules that were 4 mm or less.  Recommend repeat chest CT in 1 year (continues to smoke).  Also noted was endometrial thickening.    Pelvic ultrasound 8/24 showed fluid-filled endometrium, and right ovarian cyst which was 2.3 x 2.2 cm.       Medical problem list:   - RA   - Sanford's esophagus   - Osteopenia- femoral neck T score -2.2 on DEXA June 2019.    Health maintenance:  Prevnar 13 April 2017  Pneumovax January 2022.  Colonoscopy October 2023-7-year follow-up recommended  EGD October 2023-unremarkable        ROS:  General: Denies fevers or chills.  CV: Denies chest pain or palpitations.  Denies leg edema.  Lungs: Denies coughing or shortness of breath.  Skin: Denies rashes or nodules.  MS: Denies joint pain or joint swelling.     Objective   Visit Vitals  /80   Pulse 67   Temp 36.8 °C (98.3 °F)   Resp 16   Ht 1.765 m (5' 9.5\")   Wt 66.7 kg (147 lb)   SpO2 98%   BMI 21.40 kg/m²   OB Status Postmenopausal   Smoking Status Some Days   BSA 1.81 m²        Physical Exam  General appearance: Well-nourished and well-appearing.  HEENT: PERRL, EOMI  Neck: Supple, no nodes.  CV: RRR, no MGR.  Lungs: Clear, no rales or wheezes.  Abdomen: Soft, nontender. No hepatosplenomegaly.  Extremities:  No cyanosis, clubbing, or edema.  MS: Swollen: 0         Tender: 0         Patient global: 1         Evaluator global: 2          CDAI: 3 (low disease activity)            Skin: No rashes or nodules.    Assessment/Plan   Problem List Items Addressed This Visit           ICD-10-CM    Rheumatoid arthritis - Primary M06.9    Relevant Orders    CBC and Auto Differential    Comprehensive Metabolic Panel    C-Reactive Protein    BMI 21.0-21.9, adult Z68.21       1. " RA-low disease activity  by CDAI.  (0 swollen, 0 tender joints)     2. Osteopenia- BMD is slightly worse on bone density from May 2024.  Lowest T-score is minus 2.4 in the left total hip .  Estimated 10-year fracture risk by FRAX is 3.5% for hip fracture and 11.7% for major osteoporotic fracture.  Oral bisphosphonate is however now contraindicated because of Sanford's esophagus.    For now I recommend that she try to get calcium 1200 mg daily, vitamin D at 1000 international units daily.     3. Sanford's esophagus- now on omeprazole 40 mg daily.  EGD and colonoscopy were done Oct 25, 2023.     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     5. Vit B12 deficiency-currently on oral vitamin B12. Vitamin B12 was normal January 2023.     6. BMI 21-  weight has stabilized, and she has actually regained 6 pounds.  Extensive evaluation found no other etiology of weight loss.    7. Smoker-still smoking 11 to 12 cigarettes daily.  Had low-dose chest CT done  Oct 2023 for lung cancer screening.  She is cutting back on her smoking.  She has smoked 4 packs since September 21, 2024.  She was previously smoking 1 pack/day.  She has stopped smoking for short periods of time then resumed smoking.  She has decided to not take Chantix, but will continue to try to cut back on smoking on her own.  Low-dose chest CT will be needed 8/25.     8.  Squamous cell carcinoma excised from her back March 2024.  She had recent visit with dermatology, no other suspicious lesions were seen.    Plan:  Check CBC with diff, CMP, CRP.  Follow-up in 3 months.

## 2025-05-13 LAB
ALBUMIN SERPL-MCNC: 4.3 G/DL (ref 3.6–5.1)
ALP SERPL-CCNC: 77 U/L (ref 37–153)
ALT SERPL-CCNC: 12 U/L (ref 6–29)
ANION GAP SERPL CALCULATED.4IONS-SCNC: 10 MMOL/L (CALC) (ref 7–17)
AST SERPL-CCNC: 21 U/L (ref 10–35)
BASOPHILS # BLD AUTO: 41 CELLS/UL (ref 0–200)
BASOPHILS NFR BLD AUTO: 0.4 %
BILIRUB SERPL-MCNC: 0.5 MG/DL (ref 0.2–1.2)
BUN SERPL-MCNC: 9 MG/DL (ref 7–25)
CALCIUM SERPL-MCNC: 9.6 MG/DL (ref 8.6–10.4)
CHLORIDE SERPL-SCNC: 102 MMOL/L (ref 98–110)
CO2 SERPL-SCNC: 26 MMOL/L (ref 20–32)
CREAT SERPL-MCNC: 0.99 MG/DL (ref 0.5–1.05)
CRP SERPL-MCNC: <3 MG/L
EGFRCR SERPLBLD CKD-EPI 2021: 62 ML/MIN/1.73M2
EOSINOPHIL # BLD AUTO: 71 CELLS/UL (ref 15–500)
EOSINOPHIL NFR BLD AUTO: 0.7 %
ERYTHROCYTE [DISTWIDTH] IN BLOOD BY AUTOMATED COUNT: 15.9 % (ref 11–15)
GLUCOSE SERPL-MCNC: 88 MG/DL (ref 65–99)
HCT VFR BLD AUTO: 41.6 % (ref 35–45)
HGB BLD-MCNC: 13.1 G/DL (ref 11.7–15.5)
LYMPHOCYTES # BLD AUTO: 2203 CELLS/UL (ref 850–3900)
LYMPHOCYTES NFR BLD AUTO: 21.6 %
MCH RBC QN AUTO: 29.2 PG (ref 27–33)
MCHC RBC AUTO-ENTMCNC: 31.5 G/DL (ref 32–36)
MCV RBC AUTO: 92.9 FL (ref 80–100)
MONOCYTES # BLD AUTO: 928 CELLS/UL (ref 200–950)
MONOCYTES NFR BLD AUTO: 9.1 %
NEUTROPHILS # BLD AUTO: 6956 CELLS/UL (ref 1500–7800)
NEUTROPHILS NFR BLD AUTO: 68.2 %
PLATELET # BLD AUTO: 295 THOUSAND/UL (ref 140–400)
PMV BLD REES-ECKER: 12.2 FL (ref 7.5–12.5)
POTASSIUM SERPL-SCNC: 4.8 MMOL/L (ref 3.5–5.3)
PROT SERPL-MCNC: 7.2 G/DL (ref 6.1–8.1)
RBC # BLD AUTO: 4.48 MILLION/UL (ref 3.8–5.1)
SODIUM SERPL-SCNC: 138 MMOL/L (ref 135–146)
WBC # BLD AUTO: 10.2 THOUSAND/UL (ref 3.8–10.8)

## 2025-06-05 DIAGNOSIS — M06.9 RHEUMATOID ARTHRITIS, UNSPECIFIED: ICD-10-CM

## 2025-06-05 RX ORDER — METHOTREXATE 2.5 MG/1
20 TABLET ORAL
Qty: 32 TABLET | Refills: 2 | Status: SHIPPED | OUTPATIENT
Start: 2025-06-08

## 2025-06-05 RX ORDER — HYDROXYCHLOROQUINE SULFATE 200 MG/1
TABLET, FILM COATED ORAL DAILY
Qty: 90 TABLET | Refills: 2 | Status: SHIPPED | OUTPATIENT
Start: 2025-06-05

## 2025-08-19 ENCOUNTER — APPOINTMENT (OUTPATIENT)
Dept: RHEUMATOLOGY | Facility: CLINIC | Age: 70
End: 2025-08-19
Payer: MEDICARE

## 2025-08-19 VITALS
DIASTOLIC BLOOD PRESSURE: 82 MMHG | OXYGEN SATURATION: 98 % | SYSTOLIC BLOOD PRESSURE: 140 MMHG | HEART RATE: 75 BPM | RESPIRATION RATE: 16 BRPM | HEIGHT: 70 IN | BODY MASS INDEX: 20.76 KG/M2 | WEIGHT: 145 LBS

## 2025-08-19 DIAGNOSIS — M06.9 RHEUMATOID ARTHRITIS INVOLVING MULTIPLE SITES, UNSPECIFIED WHETHER RHEUMATOID FACTOR PRESENT (MULTI): Primary | ICD-10-CM

## 2025-08-19 PROCEDURE — 1124F ACP DISCUSS-NO DSCNMKR DOCD: CPT | Performed by: INTERNAL MEDICINE

## 2025-08-19 PROCEDURE — 1159F MED LIST DOCD IN RCRD: CPT | Performed by: INTERNAL MEDICINE

## 2025-08-19 PROCEDURE — 1160F RVW MEDS BY RX/DR IN RCRD: CPT | Performed by: INTERNAL MEDICINE

## 2025-08-19 PROCEDURE — 3008F BODY MASS INDEX DOCD: CPT | Performed by: INTERNAL MEDICINE

## 2025-08-19 PROCEDURE — 99214 OFFICE O/P EST MOD 30 MIN: CPT | Performed by: INTERNAL MEDICINE

## 2025-08-20 LAB
ALBUMIN SERPL-MCNC: 3.9 G/DL (ref 3.6–5.1)
ALP SERPL-CCNC: 74 U/L (ref 37–153)
ALT SERPL-CCNC: 11 U/L (ref 6–29)
ANION GAP SERPL CALCULATED.4IONS-SCNC: 8 MMOL/L (CALC) (ref 7–17)
AST SERPL-CCNC: 17 U/L (ref 10–35)
BASOPHILS # BLD AUTO: 36 CELLS/UL (ref 0–200)
BASOPHILS NFR BLD AUTO: 0.4 %
BILIRUB SERPL-MCNC: 0.4 MG/DL (ref 0.2–1.2)
BUN SERPL-MCNC: 9 MG/DL (ref 7–25)
CALCIUM SERPL-MCNC: 9 MG/DL (ref 8.6–10.4)
CHLORIDE SERPL-SCNC: 101 MMOL/L (ref 98–110)
CO2 SERPL-SCNC: 26 MMOL/L (ref 20–32)
CREAT SERPL-MCNC: 0.96 MG/DL (ref 0.5–1.05)
CRP SERPL-MCNC: <3 MG/L
EGFRCR SERPLBLD CKD-EPI 2021: 64 ML/MIN/1.73M2
EOSINOPHIL # BLD AUTO: 54 CELLS/UL (ref 15–500)
EOSINOPHIL NFR BLD AUTO: 0.6 %
ERYTHROCYTE [DISTWIDTH] IN BLOOD BY AUTOMATED COUNT: 17.2 % (ref 11–15)
GLUCOSE SERPL-MCNC: 93 MG/DL (ref 65–99)
HCT VFR BLD AUTO: 39.6 % (ref 35–45)
HGB BLD-MCNC: 12.6 G/DL (ref 11.7–15.5)
LYMPHOCYTES # BLD AUTO: 1431 CELLS/UL (ref 850–3900)
LYMPHOCYTES NFR BLD AUTO: 15.9 %
MCH RBC QN AUTO: 29.7 PG (ref 27–33)
MCHC RBC AUTO-ENTMCNC: 31.8 G/DL (ref 32–36)
MCV RBC AUTO: 93.4 FL (ref 80–100)
MONOCYTES # BLD AUTO: 666 CELLS/UL (ref 200–950)
MONOCYTES NFR BLD AUTO: 7.4 %
NEUTROPHILS # BLD AUTO: 6813 CELLS/UL (ref 1500–7800)
NEUTROPHILS NFR BLD AUTO: 75.7 %
PLATELET # BLD AUTO: 246 THOUSAND/UL (ref 140–400)
PMV BLD REES-ECKER: 12.2 FL (ref 7.5–12.5)
POTASSIUM SERPL-SCNC: 4.1 MMOL/L (ref 3.5–5.3)
PROT SERPL-MCNC: 6.9 G/DL (ref 6.1–8.1)
RBC # BLD AUTO: 4.24 MILLION/UL (ref 3.8–5.1)
SODIUM SERPL-SCNC: 135 MMOL/L (ref 135–146)
WBC # BLD AUTO: 9 THOUSAND/UL (ref 3.8–10.8)

## 2025-08-28 DIAGNOSIS — M06.9 RHEUMATOID ARTHRITIS, UNSPECIFIED: ICD-10-CM

## 2025-08-28 RX ORDER — METHOTREXATE 2.5 MG/1
20 TABLET ORAL
Qty: 32 TABLET | Refills: 2 | Status: SHIPPED | OUTPATIENT
Start: 2025-08-28

## 2025-11-24 ENCOUNTER — APPOINTMENT (OUTPATIENT)
Dept: RHEUMATOLOGY | Facility: CLINIC | Age: 70
End: 2025-11-24
Payer: MEDICARE